# Patient Record
Sex: FEMALE | Race: WHITE | NOT HISPANIC OR LATINO | Employment: OTHER | URBAN - NONMETROPOLITAN AREA
[De-identification: names, ages, dates, MRNs, and addresses within clinical notes are randomized per-mention and may not be internally consistent; named-entity substitution may affect disease eponyms.]

---

## 2017-12-11 ENCOUNTER — COMMUNICATION - GICH (OUTPATIENT)
Dept: FAMILY MEDICINE | Facility: OTHER | Age: 62
End: 2017-12-11

## 2017-12-12 ENCOUNTER — OFFICE VISIT - GICH (OUTPATIENT)
Dept: FAMILY MEDICINE | Facility: OTHER | Age: 62
End: 2017-12-12

## 2017-12-12 ENCOUNTER — HISTORY (OUTPATIENT)
Dept: FAMILY MEDICINE | Facility: OTHER | Age: 62
End: 2017-12-12

## 2017-12-12 DIAGNOSIS — K22.4 DYSKINESIA OF ESOPHAGUS: ICD-10-CM

## 2018-02-07 ENCOUNTER — OFFICE VISIT - GICH (OUTPATIENT)
Dept: FAMILY MEDICINE | Facility: OTHER | Age: 63
End: 2018-02-07

## 2018-02-07 ENCOUNTER — HISTORY (OUTPATIENT)
Dept: FAMILY MEDICINE | Facility: OTHER | Age: 63
End: 2018-02-07

## 2018-02-07 DIAGNOSIS — L90.0 LICHEN SCLEROSUS ET ATROPHICUS: ICD-10-CM

## 2018-02-07 DIAGNOSIS — Z00.00 ENCOUNTER FOR GENERAL ADULT MEDICAL EXAMINATION WITHOUT ABNORMAL FINDINGS: ICD-10-CM

## 2018-02-09 VITALS
WEIGHT: 116 LBS | HEART RATE: 66 BPM | BODY MASS INDEX: 19.81 KG/M2 | DIASTOLIC BLOOD PRESSURE: 66 MMHG | SYSTOLIC BLOOD PRESSURE: 114 MMHG | HEIGHT: 64 IN

## 2018-02-09 VITALS
HEIGHT: 64 IN | DIASTOLIC BLOOD PRESSURE: 62 MMHG | WEIGHT: 116.2 LBS | SYSTOLIC BLOOD PRESSURE: 108 MMHG | HEART RATE: 60 BPM | BODY MASS INDEX: 19.84 KG/M2

## 2018-02-12 ENCOUNTER — TELEPHONE (OUTPATIENT)
Dept: FAMILY MEDICINE | Facility: OTHER | Age: 63
End: 2018-02-12

## 2018-02-12 NOTE — NURSING NOTE
Patient Information     Patient Name MRN Wanda Plascencia [id number] Female 1955      Nursing Note by Jeanna Barros at 2017 11:30 AM     Author:  Jeanna Barros Service:  (none) Author Type:  (none)     Filed:  2017 11:50 AM Encounter Date:  2017 Status:  Signed     :  Jeanna Barros            Patient is here dir concerns of heaviness in chest, a warm sensation around abdomen, numbness in arms at times, a pinched nerve in shoulder blade, and a sharp pain down hip. Patient states all has been going on for a couple weeks. Patient states is all same as symptoms as last year.  Jeanna Barros LPN .............2017  11:35 AM

## 2018-02-12 NOTE — PATIENT INSTRUCTIONS
Patient Information     Patient Name MRN Sex Wanda Elias N [id number] Female 1955      Patient Instructions by Mary Barnard MD at 2017 11:30 AM     Author:  Mary Barnard MD  Service:  (none) Author Type:  Physician     Filed:  2017 12:13 PM  Encounter Date:  2017 Status:  Addendum     :  Mary Barnard MD (Physician)        Related Notes: Original Note by Mary Barnard MD (Physician) filed at 2017 12:12 PM            Esophageal spasm related to over acid production  prilosec 20 mg daily x 4 weeks  carafate twice daily x 2 weeks    If no change, recommend abdominal ultrasound (you can call/mychart)    Could also consider CT scan for further evaluation    Return if increased pain, associated with exercise would be concerning    Consider DEXA (bone density) to check for osteoporosis screening

## 2018-02-12 NOTE — TELEPHONE ENCOUNTER
"Patient Information     Patient Name MRN Wanda Plascencia N [id number] Female 1955      Telephone Encounter by Cee Bar RN at 2017  4:53 PM     Author:  Cee Bar RN Service:  (none) Author Type:  NURS- Registered Nurse     Filed:  2017  5:31 PM Encounter Date:  2017 Status:  Signed     :  Cee Bar RN (NURS- Registered Nurse)            Patient called today complaining of a \"warm sensation in her back, stomach and down her leg\". Patient also has a \"pinch\" or pressure between her breasts. Patient has shoulder blade pain on right that feels like a pinched nerve. Based on symptomology and triage protocol, Patient fits the category for a visit to the ED. Patient was very resistant to going to the ED and was then encouraged, if she would not go, to make an appointment ASAP tomorrow with a provider in the clinic. Patient has been trying to decide who to establish care with as PCP will no longer be seeing Patients. Discussed in depth, the risks and how women can experience a different set of symptoms than men for a heart attack. Patient was strongly urged to come to ED this evening if she experiences any of the following: pain lasting greater than a few seconds, increased/different pain in shoulder, arm or jaw, difficulty breathing, lightheadedness, visible sweating, slow or rapid pulse, or confusion. Patient verbalized agreement with this plan.     Intermittent chest pain is on Patient's problem list.    Reason for Disposition    Dizziness or lightheadedness     Attributes to eye problems (trouble focusing).    Pain also present in shoulder(s) or arm(s) or jaw  (Exception: pain is clearly made worse by movement)     Patient states she has pain in jaw from time to time, but attributes it to TMJ.  Patient states she has pain in right shoulder blade, like a pinched nerve.    [1] Intermittent  chest pain or \"angina\" AND [2] increasing in severity or frequency  " "(Exception: pains lasting a few seconds)     Patient describes a \"pinch\" or pressure in between breasts. Patient denies shooting pains or pain lasting even a few seconds.    [1] Intermittent chest pain from \"angina\" AND [2] NO increase in severity or frequency    Answer Assessment - Initial Assessment Questions  1. LOCATION: \"Where does it hurt?\"    Patient describes \"pinch\" and pressure in between breasts    2. RADIATION: \"Does the pain go anywhere else?\" (e.g., into neck, jaw, arms, back)  Patient describes \"warm sensation\" in stomach, back and leg  Patient describes pain in right shoulder/hip, \"like pinched nerve\"  Jaw pain (from time to time, Patient has TMJ)  Heart racing (felt this a couple of times)- attributes this to anxiety    3. ONSET: \"When did the chest pain begin?\" (Minutes, hours or days)   Patient having a dull underlying pain she feels once in a while    4. PATTERN \"Does the pain come and go, or has it been constant since it started?\"  \"Does it get worse with exertion?\"   Pain and \"warm sensation\" moves around (not in any one area for long)    5. DURATION: \"How long does it last\" (e.g., seconds, minutes, hours)  Underlying and dull (hard to estimate and hard to explain)    6. SEVERITY: \"How bad is the pain?\"  (e.g., Scale 1-10; mild, moderate, or severe)     - MILD (1-3): doesn't interfere with normal activities      - MODERATE (4-7): interferes with normal activities or awakens from sleep     - SEVERE (8-10): excruciating pain, unable to do any normal activities    MILD (1-3)    7. CARDIAC RISK FACTORS: \"Do you have any history of heart problems or risk factors for heart disease?\" (e.g., prior heart attack, angina; high blood pressure, diabetes, being overweight, high cholesterol, smoking, or strong family history of heart disease)  Patient has come in before for similar symptoms, but all cardiac testing was negative    8. PULMONARY RISK FACTORS: \"Do you have any history of lung disease?\"  (e.g., " "blood clots in lung, asthma, emphysema, birth control pills)  Patient denies    9. CAUSE: \"What do you think is causing the chest pain?\"  Unsure    10. OTHER SYMPTOMS: \"Do you have any other symptoms?\" (e.g., dizziness, nausea, vomiting, sweating, fever, difficulty breathing, cough)  Dizziness/Nausea (Patient attributes this to eye problems-trouble focusing)  Sweating yesterday evening (attributed this to just taking hot bath)    Patient denies vomiting, fever, shortness of breath or cough    Patient states she lost her son 8 years ago, and after that had \"heaviness in her chest, felt like a brick and was hard to breath\". She attributed this to grief and this time it feels very different.    Protocols used: ADULT CHEST PAIN-A-    Cee Bar RN .............. 12/11/2017  5:31 PM            "

## 2018-02-12 NOTE — PROGRESS NOTES
Patient Information     Patient Name MRN Sex Wanda Elias [id number] Female 1955      Progress Notes by Mary Barnard MD at 2017 11:30 AM     Author:  Mary Barnard MD Service:  (none) Author Type:  Physician     Filed:  2017 10:39 AM Encounter Date:  2017 Status:  Signed     :  Mary Barnard MD (Physician)            SUBJECTIVE:    Wanda Crespo is a 62 y.o. female who presents for discomfort in epigastric area, feels similar to episode  Last year that responded to PPI and carafate  Describes as a burning sensation, also has right shoulder discomfort, this has been an on going issue but more frequent recently  Not associated with exercise  NO associated nausea, vomiting, sweating    HPI    No Known Allergies,   Current Outpatient Prescriptions on File Prior to Visit       Medication  Sig Dispense Refill     calcium carbonate (CALCIUM 500) 500 mg calcium (1,250 mg) tablet Take 1 tablet by mouth 3 times daily with meals.  0     cholecalciferol (VITAMIN D-3) 2,000 unit capsule Take 1 capsule by mouth once daily.  0     multivitamin (MVI) tablet Take 1 tablet by mouth once daily.  0     Omega-3 Fatty Acids 1,250 mg capsule Take 1 capsule by mouth 2 times daily.  0     No current facility-administered medications on file prior to visit.     and   Patient Active Problem List      Diagnosis Date Noted     LESION, VULVA 2012     CHEST PAIN, INTERMITTENT 2012     OTHER LICHEN NOT ELSEWHERE CLASSIFIED 2012     MOURNING 2010       REVIEW OF SYSTEMS:  Review of Systems   Constitutional: Negative for chills, fever, malaise/fatigue and weight loss.   HENT: Negative for congestion, hearing loss and nosebleeds.    Respiratory: Negative for cough and shortness of breath.    Cardiovascular: Negative for chest pain, palpitations, orthopnea, claudication and leg swelling.   Gastrointestinal: Positive for abdominal pain and heartburn. Negative  "for blood in stool, constipation, diarrhea, melena, nausea and vomiting.   Genitourinary: Negative for frequency and urgency.   Musculoskeletal: Negative for falls, myalgias and neck pain.   Skin: Negative for rash.   Neurological: Negative for tingling, sensory change and focal weakness.       OBJECTIVE:  /66 (Cuff Site: Right Arm, Position: Sitting, Cuff Size: Adult Regular)  Pulse 66  Ht 1.613 m (5' 3.5\")  Wt 52.6 kg (116 lb)  BMI 20.23 kg/m2    EXAM:   Physical Exam   Constitutional: She is well-developed, well-nourished, and in no distress.   HENT:   Right Ear: External ear normal.   Left Ear: External ear normal.   Nose: Nose normal.   Neck: No thyromegaly present.   Cardiovascular: Normal rate and regular rhythm.    Pulmonary/Chest: Effort normal and breath sounds normal. No respiratory distress.   Abdominal: Soft. Bowel sounds are normal. She exhibits no distension and no mass. There is no tenderness. There is no rebound and no guarding.   Musculoskeletal: She exhibits no edema.   Lymphadenopathy:     She has no cervical adenopathy.   Skin: No rash noted.       ASSESSMENT/PLAN:    ICD-10-CM    1. Esophageal spasm K22.4 omeprazole (PRILOSEC) 20 mg Delayed-Release capsule      sucralfate (CARAFATE) 100 mg/mL suspension        Plan:  Symptoms are very similar to last year which had responded to PPI and carafate. Does not sound cardiac,she is concerned about cost of work-up as she has HDP. Declines EKG, reviewed previous which was normal  Will proceed with PP and carafate  She will call if no change  Plan outlined below  She ios comfortable with this plan and will let me know if things change  Patient Instructions   Esophageal spasm related to over acid production  prilosec 20 mg daily x 4 weeks  carafate twice daily x 2 weeks    If no change, recommend abdominal ultrasound (you can call/mychart)    Could also consider CT scan for further evaluation    Return if increased pain, associated with exercise " would be concerning    Consider DEXA (bone density) to check for osteoporosis screening    Total of 25 minutes was spent with patient in counseling and coordination of care.  Mary Chaudhary MD  10:36 AM 12/21/2017

## 2018-02-13 ENCOUNTER — DOCUMENTATION ONLY (OUTPATIENT)
Dept: FAMILY MEDICINE | Facility: OTHER | Age: 63
End: 2018-02-13

## 2018-02-13 RX ORDER — CALCIUM CARBONATE/VITAMIN D3 600 MG-10
1250 TABLET ORAL 2 TIMES DAILY
COMMUNITY
Start: 2016-11-22 | End: 2024-02-08

## 2018-02-13 RX ORDER — ACETAMINOPHEN 160 MG
2000 TABLET,DISINTEGRATING ORAL DAILY
COMMUNITY
Start: 2016-11-22

## 2018-02-13 RX ORDER — DIPHENOXYLATE HYDROCHLORIDE AND ATROPINE SULFATE 2.5; .025 MG/1; MG/1
1 TABLET ORAL DAILY
COMMUNITY
Start: 2016-11-22 | End: 2024-02-08

## 2018-02-13 RX ORDER — IBUPROFEN 200 MG
1250 CAPSULE ORAL DAILY
COMMUNITY
Start: 2016-11-22

## 2018-02-13 RX ORDER — SUCRALFATE ORAL 1 G/10ML
1000 SUSPENSION ORAL 2 TIMES DAILY PRN
COMMUNITY
Start: 2017-12-12 | End: 2019-10-23

## 2018-02-13 NOTE — PATIENT INSTRUCTIONS
Patient Information     Patient Name MRN Wanda Plascencia N [id number] Female 1955      Patient Instructions by Kamryn Lopez MD at 2018  8:15 AM     Author:  Kamryn Lopez MD Service:  (none) Author Type:  Physician     Filed:  2018  9:11 AM Encounter Date:  2018 Status:  Signed     :  Kamryn Lopez MD (Physician)            Consider topical estrogen for vaginal dryness. This can be estradiol cream, a ring (estring) or a tablet (vagifem) form. If wanting an prescription sent to your pharmacy, please let me know.     Also consider using water based vaginal lubrication.

## 2018-02-13 NOTE — NURSING NOTE
Patient Information     Patient Name MRN Wanda Plascencia [id number] Female 1955      Nursing Note by Ling Cain at 2018  8:15 AM     Author:  Ling Cain Service:  (none) Author Type:  (none)     Filed:  2018  8:35 AM Encounter Date:  2018 Status:  Signed     :  Ling Cain            Patient here for yearly physical.   Ling Cain LPN ..........2018 8:12 AM
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
## 2018-02-16 ENCOUNTER — HOSPITAL ENCOUNTER (OUTPATIENT)
Dept: MAMMOGRAPHY | Facility: OTHER | Age: 63
Discharge: HOME | End: 2018-02-16
Attending: FAMILY MEDICINE | Admitting: FAMILY MEDICINE
Payer: COMMERCIAL

## 2018-02-16 DIAGNOSIS — Z12.39 SCREENING BREAST EXAMINATION: ICD-10-CM

## 2018-02-16 PROCEDURE — 77067 SCR MAMMO BI INCL CAD: CPT

## 2018-02-18 ENCOUNTER — HEALTH MAINTENANCE LETTER (OUTPATIENT)
Age: 63
End: 2018-02-18

## 2018-02-22 ENCOUNTER — TELEPHONE (OUTPATIENT)
Dept: FAMILY MEDICINE | Facility: OTHER | Age: 63
End: 2018-02-22

## 2018-02-22 NOTE — TELEPHONE ENCOUNTER
Returned call to patient and verified last name and date of birth. Patient was looking for mammogram results. I read the letter that was mailed out to her on the 16th.    Katelyn Robles LPN on 2/22/2018 at 2:07 PM

## 2018-03-05 NOTE — ADDENDUM NOTE
Patient Information     Patient Name MRN Wanda Plascencia [id number] Female 1955      Addendum Note by Brielle Davis at 2018 11:11 AM     Author:  Brielle Davis Service:  (none) Author Type:  (none)     Filed:  2018 11:11 AM Encounter Date:  2018 Status:  Signed     :  Brielle Davis       Addended by: BRIELLE DAVIS on: 2018 11:11 AM        Modules accepted: Orders

## 2018-03-05 NOTE — PROGRESS NOTES
Patient Information     Patient Name MRN Sex Wanda Elias N [id number] Female 1955      Progress Notes by Kamryn Lopez MD at 2018  3:19 PM     Author:  Kamryn Lopez MD Service:  (none) Author Type:  Physician     Filed:  2018  3:19 PM Encounter Date:  2018 Status:  Signed     :  Kamryn Lopez MD (Physician)            Notified of results via [employer].

## 2018-03-16 ENCOUNTER — TELEPHONE (OUTPATIENT)
Dept: FAMILY MEDICINE | Facility: OTHER | Age: 63
End: 2018-03-16

## 2018-03-16 NOTE — TELEPHONE ENCOUNTER
Patient calling regarding receiving an e-mail stating she had new results. Informed Patient that we have had a computer change and some old results are being placed in the system.  She currently has no new results in her chart. Explained about the new my chart and things will come under [place] not robyn Cain LPN ..........3/16/2018 9:21 AM

## 2019-10-23 ENCOUNTER — OFFICE VISIT (OUTPATIENT)
Dept: FAMILY MEDICINE | Facility: OTHER | Age: 64
End: 2019-10-23
Attending: FAMILY MEDICINE
Payer: COMMERCIAL

## 2019-10-23 VITALS
DIASTOLIC BLOOD PRESSURE: 62 MMHG | HEIGHT: 64 IN | RESPIRATION RATE: 16 BRPM | BODY MASS INDEX: 19.46 KG/M2 | HEART RATE: 60 BPM | WEIGHT: 114 LBS | TEMPERATURE: 96.9 F | SYSTOLIC BLOOD PRESSURE: 110 MMHG

## 2019-10-23 DIAGNOSIS — Z00.00 HEALTH CARE MAINTENANCE: Primary | ICD-10-CM

## 2019-10-23 DIAGNOSIS — L90.0 LICHEN SCLEROSUS ET ATROPHICUS: ICD-10-CM

## 2019-10-23 PROCEDURE — 99396 PREV VISIT EST AGE 40-64: CPT | Performed by: FAMILY MEDICINE

## 2019-10-23 RX ORDER — CLOBETASOL PROPIONATE 0.5 MG/G
CREAM TOPICAL 2 TIMES DAILY
Qty: 15 G | Refills: 1 | Status: SHIPPED | OUTPATIENT
Start: 2019-10-23 | End: 2021-04-21

## 2019-10-23 ASSESSMENT — PAIN SCALES - GENERAL: PAINLEVEL: NO PAIN (0)

## 2019-10-23 ASSESSMENT — MIFFLIN-ST. JEOR: SCORE: 1049.16

## 2019-10-23 NOTE — NURSING NOTE
"Patient here for yearly physical.  Ling Cain LPN ..........10/23/2019 1:28 PM   Chief Complaint   Patient presents with     Physical     yearly       Initial /62 (BP Location: Right arm, Patient Position: Sitting, Cuff Size: Adult Regular)   Pulse 60   Temp 96.9  F (36.1  C) (Tympanic)   Resp 16   Ht 1.613 m (5' 3.5\")   Wt 51.7 kg (114 lb)   LMP  (LMP Unknown)   BMI 19.88 kg/m   Estimated body mass index is 19.88 kg/m  as calculated from the following:    Height as of this encounter: 1.613 m (5' 3.5\").    Weight as of this encounter: 51.7 kg (114 lb).  Medication Reconciliation: complete    Ling Cain LPN    "
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
## 2019-10-29 DIAGNOSIS — L90.0 LICHEN SCLEROSUS ET ATROPHICUS: ICD-10-CM

## 2019-10-29 RX ORDER — CLOBETASOL PROPIONATE 0.5 MG/G
CREAM TOPICAL
Qty: 15 G | Refills: 1 | Status: CANCELLED | OUTPATIENT
Start: 2019-10-31

## 2019-11-26 ENCOUNTER — HOSPITAL ENCOUNTER (OUTPATIENT)
Dept: MAMMOGRAPHY | Facility: OTHER | Age: 64
Discharge: HOME | End: 2019-11-26
Attending: FAMILY MEDICINE | Admitting: FAMILY MEDICINE
Payer: COMMERCIAL

## 2019-11-26 DIAGNOSIS — Z00.00 HEALTH CARE MAINTENANCE: ICD-10-CM

## 2019-11-26 DIAGNOSIS — Z12.31 VISIT FOR SCREENING MAMMOGRAM: ICD-10-CM

## 2019-11-26 PROCEDURE — 77063 BREAST TOMOSYNTHESIS BI: CPT

## 2020-01-21 ENCOUNTER — ALLIED HEALTH/NURSE VISIT (OUTPATIENT)
Dept: FAMILY MEDICINE | Facility: OTHER | Age: 65
End: 2020-01-21
Attending: FAMILY MEDICINE
Payer: COMMERCIAL

## 2020-01-21 DIAGNOSIS — Z23 NEED FOR ZOSTER VACCINATION: Primary | ICD-10-CM

## 2020-01-21 PROCEDURE — 90750 HZV VACC RECOMBINANT IM: CPT

## 2020-01-21 PROCEDURE — 90471 IMMUNIZATION ADMIN: CPT

## 2020-01-21 NOTE — PROGRESS NOTES
Patient stated reason for visit today is to receive 1st Shingrix vaccine. Patient denied any concerns with previous injections. Injection prepared and administered IM into LD as ordered. Administration of medication documented in MAR (see MAR for further information regarding dose, lot #, NDC #, expiration date). Patient encouraged to wait in lobby for 15 minutes post-injection and notify staff immediately of any reaction.     Copy of VIS given    Upcoming appt for booster 4/6/2020    Payton Camp RN  ....................  1/21/2020   9:16 AM

## 2020-03-10 ENCOUNTER — HEALTH MAINTENANCE LETTER (OUTPATIENT)
Age: 65
End: 2020-03-10

## 2020-04-06 ENCOUNTER — ALLIED HEALTH/NURSE VISIT (OUTPATIENT)
Dept: FAMILY MEDICINE | Facility: OTHER | Age: 65
End: 2020-04-06
Attending: FAMILY MEDICINE
Payer: COMMERCIAL

## 2020-04-06 DIAGNOSIS — Z23 NEED FOR ZOSTER VACCINATION: Primary | ICD-10-CM

## 2020-04-06 PROCEDURE — 90471 IMMUNIZATION ADMIN: CPT

## 2020-04-06 PROCEDURE — 90750 HZV VACC RECOMBINANT IM: CPT

## 2020-04-06 NOTE — PROGRESS NOTES
Immunization Documentation  Verified patient's name and . Stated reason for visit today is to receive Shingrix vaccine(s). Denied any concerns with previous immunizations. Allergies reviewed.  Screening Questionnaire Adult Immunization completed (see below). VIS handout(s) reviewed and given to take home. Shingrix prepared and administered IM per standing order. Administration documented in IMMUNIZATIONS (see flowsheet and order for further information). Instructed to wait in lobby for 15 minutes post-injection and notify RN immediately of any adverse reaction.     Screening Questionnaire for Adult Immunization    Are you sick today?   No   Do you have allergies to medications, food, a vaccine component, or latex?   No   Have you ever had a serious reaction after receiving a vaccination?   No   Have you received any vaccinations in the past 4 weeks?   No     Immunization questionnaire answers were all negative.    Miller Kurtz RN, BSN  ....................  2020   8:07 AM

## 2020-09-01 DIAGNOSIS — Z12.11 SCREENING FOR COLON CANCER: ICD-10-CM

## 2020-09-01 DIAGNOSIS — Z01.818 PRE-OP TESTING: Primary | ICD-10-CM

## 2020-09-01 NOTE — TELEPHONE ENCOUNTER
Screening Questions for the Scheduling of Screening Colonoscopies   (If Colonoscopy is diagnostic, Provider should review the chart before scheduling.)  Are you younger than 50 or older than 80?  NO   Do you take aspirin or fish oil?  YES - FISH OIL  (if yes, tell patient to stop 1 week prior to Colonoscopy)  Do you take warfarin (Coumadin), clopidogrel (Plavix), apixaban (Eliquis), dabigatram (Pradaxa), rivaroxaban (Xarelto) or any blood thinner? NO   Do you use oxygen at home?  NO   Do you have kidney disease? NO   Are you on dialysis? NO   Have you had a stroke or heart attack in the last year? NO   Have you had a stent in your heart or any blood vessel in the last year? NO   Have you had a transplant of any organ? NO   Have you had a colonoscopy or upper endoscopy (EGD) before? YES          When?  2010  Date of scheduled Colonoscopy. 09/29/2020  Provider [facility]   Pharmacy [facility]

## 2020-09-14 RX ORDER — BISACODYL 5 MG
TABLET, DELAYED RELEASE (ENTERIC COATED) ORAL
Qty: 2 TABLET | Refills: 0 | Status: ON HOLD | OUTPATIENT
Start: 2020-09-14 | End: 2020-09-29

## 2020-09-14 RX ORDER — POLYETHYLENE GLYCOL 3350, SODIUM CHLORIDE, SODIUM BICARBONATE, POTASSIUM CHLORIDE 420; 11.2; 5.72; 1.48 G/4L; G/4L; G/4L; G/4L
4000 POWDER, FOR SOLUTION ORAL ONCE
Qty: 4000 ML | Refills: 0 | Status: SHIPPED | OUTPATIENT
Start: 2020-09-14 | End: 2020-09-14

## 2020-09-26 ENCOUNTER — ALLIED HEALTH/NURSE VISIT (OUTPATIENT)
Dept: FAMILY MEDICINE | Facility: OTHER | Age: 65
End: 2020-09-26
Attending: SURGERY
Payer: COMMERCIAL

## 2020-09-26 DIAGNOSIS — Z01.818 PRE-OP TESTING: ICD-10-CM

## 2020-09-26 PROCEDURE — C9803 HOPD COVID-19 SPEC COLLECT: HCPCS

## 2020-09-26 PROCEDURE — 99207 ZZC NO CHARGE NURSE ONLY: CPT

## 2020-09-26 PROCEDURE — U0003 INFECTIOUS AGENT DETECTION BY NUCLEIC ACID (DNA OR RNA); SEVERE ACUTE RESPIRATORY SYNDROME CORONAVIRUS 2 (SARS-COV-2) (CORONAVIRUS DISEASE [COVID-19]), AMPLIFIED PROBE TECHNIQUE, MAKING USE OF HIGH THROUGHPUT TECHNOLOGIES AS DESCRIBED BY CMS-2020-01-R: HCPCS | Mod: ZL | Performed by: SURGERY

## 2020-09-26 NOTE — NURSING NOTE
Patient swabbed for COVID-19 testing.  Nidia Coronado LPN on 9/26/2020 at 8:28 AM       non-verbal indicators present

## 2020-09-27 LAB
SARS-COV-2 RNA SPEC QL NAA+PROBE: NOT DETECTED
SPECIMEN SOURCE: NORMAL

## 2020-09-29 ENCOUNTER — HOSPITAL ENCOUNTER (OUTPATIENT)
Facility: OTHER | Age: 65
Discharge: HOME | End: 2020-09-29
Attending: SURGERY | Admitting: SURGERY
Payer: COMMERCIAL

## 2020-09-29 ENCOUNTER — ANESTHESIA (OUTPATIENT)
Dept: SURGERY | Facility: OTHER | Age: 65
End: 2020-09-29
Payer: COMMERCIAL

## 2020-09-29 ENCOUNTER — ANESTHESIA EVENT (OUTPATIENT)
Dept: SURGERY | Facility: OTHER | Age: 65
End: 2020-09-29
Payer: COMMERCIAL

## 2020-09-29 VITALS
OXYGEN SATURATION: 99 % | RESPIRATION RATE: 16 BRPM | HEIGHT: 63 IN | TEMPERATURE: 95.5 F | DIASTOLIC BLOOD PRESSURE: 112 MMHG | SYSTOLIC BLOOD PRESSURE: 134 MMHG | WEIGHT: 114 LBS | BODY MASS INDEX: 20.2 KG/M2 | HEART RATE: 105 BPM

## 2020-09-29 DIAGNOSIS — K63.5 POLYP OF COLON, UNSPECIFIED PART OF COLON, UNSPECIFIED TYPE: Primary | ICD-10-CM

## 2020-09-29 PROCEDURE — 45380 COLONOSCOPY AND BIOPSY: CPT | Mod: PT,XU | Performed by: SURGERY

## 2020-09-29 PROCEDURE — 25000128 H RX IP 250 OP 636: Performed by: NURSE ANESTHETIST, CERTIFIED REGISTERED

## 2020-09-29 PROCEDURE — 45380 COLONOSCOPY AND BIOPSY: CPT | Mod: PT | Performed by: SURGERY

## 2020-09-29 PROCEDURE — 25800030 ZZH RX IP 258 OP 636: Performed by: SURGERY

## 2020-09-29 PROCEDURE — 45384 COLONOSCOPY W/LESION REMOVAL: CPT | Performed by: SURGERY

## 2020-09-29 PROCEDURE — 45385 COLONOSCOPY W/LESION REMOVAL: CPT | Mod: PT | Performed by: SURGERY

## 2020-09-29 PROCEDURE — 25000132 ZZH RX MED GY IP 250 OP 250 PS 637: Performed by: SURGERY

## 2020-09-29 PROCEDURE — 25000125 ZZHC RX 250: Performed by: SURGERY

## 2020-09-29 PROCEDURE — 45385 COLONOSCOPY W/LESION REMOVAL: CPT | Mod: PT

## 2020-09-29 PROCEDURE — 88305 TISSUE EXAM BY PATHOLOGIST: CPT

## 2020-09-29 PROCEDURE — 40000010 ZZH STATISTIC ANES STAT CODE-CRNA PER MINUTE: Performed by: SURGERY

## 2020-09-29 PROCEDURE — 25000125 ZZHC RX 250: Performed by: NURSE ANESTHETIST, CERTIFIED REGISTERED

## 2020-09-29 PROCEDURE — 45385 COLONOSCOPY W/LESION REMOVAL: CPT | Performed by: NURSE ANESTHETIST, CERTIFIED REGISTERED

## 2020-09-29 RX ORDER — PROPOFOL 10 MG/ML
INJECTION, EMULSION INTRAVENOUS CONTINUOUS PRN
Status: DISCONTINUED | OUTPATIENT
Start: 2020-09-29 | End: 2020-09-29

## 2020-09-29 RX ORDER — LIDOCAINE 40 MG/G
CREAM TOPICAL
Status: DISCONTINUED | OUTPATIENT
Start: 2020-09-29 | End: 2020-09-29 | Stop reason: HOSPADM

## 2020-09-29 RX ORDER — LIDOCAINE HYDROCHLORIDE 20 MG/ML
INJECTION, SOLUTION INFILTRATION; PERINEURAL PRN
Status: DISCONTINUED | OUTPATIENT
Start: 2020-09-29 | End: 2020-09-29

## 2020-09-29 RX ORDER — NALOXONE HYDROCHLORIDE 0.4 MG/ML
.1-.4 INJECTION, SOLUTION INTRAMUSCULAR; INTRAVENOUS; SUBCUTANEOUS
Status: DISCONTINUED | OUTPATIENT
Start: 2020-09-29 | End: 2020-09-29 | Stop reason: HOSPADM

## 2020-09-29 RX ORDER — SIMETHICONE
LIQUID (ML) MISCELLANEOUS PRN
Status: DISCONTINUED | OUTPATIENT
Start: 2020-09-29 | End: 2020-09-29 | Stop reason: HOSPADM

## 2020-09-29 RX ORDER — SODIUM CHLORIDE, SODIUM LACTATE, POTASSIUM CHLORIDE, CALCIUM CHLORIDE 600; 310; 30; 20 MG/100ML; MG/100ML; MG/100ML; MG/100ML
INJECTION, SOLUTION INTRAVENOUS CONTINUOUS
Status: DISCONTINUED | OUTPATIENT
Start: 2020-09-29 | End: 2020-09-29 | Stop reason: HOSPADM

## 2020-09-29 RX ORDER — PROPOFOL 10 MG/ML
INJECTION, EMULSION INTRAVENOUS PRN
Status: DISCONTINUED | OUTPATIENT
Start: 2020-09-29 | End: 2020-09-29

## 2020-09-29 RX ORDER — FLUMAZENIL 0.1 MG/ML
0.2 INJECTION, SOLUTION INTRAVENOUS
Status: DISCONTINUED | OUTPATIENT
Start: 2020-09-29 | End: 2020-09-29 | Stop reason: HOSPADM

## 2020-09-29 RX ADMIN — PROPOFOL 50 MG: 10 INJECTION, EMULSION INTRAVENOUS at 07:31

## 2020-09-29 RX ADMIN — PROPOFOL 140 MCG/KG/MIN: 10 INJECTION, EMULSION INTRAVENOUS at 07:31

## 2020-09-29 RX ADMIN — LIDOCAINE HYDROCHLORIDE 0.1 ML: 10 INJECTION, SOLUTION EPIDURAL; INFILTRATION; INTRACAUDAL; PERINEURAL at 07:14

## 2020-09-29 RX ADMIN — SODIUM CHLORIDE, POTASSIUM CHLORIDE, SODIUM LACTATE AND CALCIUM CHLORIDE 30 ML/HR: 600; 310; 30; 20 INJECTION, SOLUTION INTRAVENOUS at 07:14

## 2020-09-29 RX ADMIN — LIDOCAINE HYDROCHLORIDE 60 MG: 20 INJECTION, SOLUTION INFILTRATION; PERINEURAL at 07:31

## 2020-09-29 ASSESSMENT — MIFFLIN-ST. JEOR: SCORE: 1036.23

## 2020-09-29 NOTE — DISCHARGE INSTRUCTIONS
Your doctor recommends that you eat 25 to 30 Grams of fiber daily. The following are some examples of fiber amounts in different foods.    Fruits: Apple (with skin) 1 medium = 4.4 Grams   Banana      1 medium = 3.1 Grams   Oranges     1 orange = 3.1 Grams   Prunes     1 cup, pitted = 12.4 Grams    Juices: Apple, unsweetened w/ added ascorbic acid  1 cup = 0.5 Grams    Grapefruit, white, canned,sweetened  1 cup = 0.2 Grams    Grape, unsweetened w/ added ascorbic acid  1 cup = 0.5 Grams    Orange     1 cup = 0.7 Grams    Vegetables:   Cooked: Green Beans   1 cup = 4.0 Grams       Carrots   1/2 cup sliced = 2.3 Grams       Peas       1 cup = 8.8 Grams       Potato (baked, with skin)  1 medium = 3.8 Grams    Raw: Cucumber (with peel)  1 cucumber = 1.5 Grams            Lettuce     1 cup shredded = 0.5 Grams            Tomato   1 medium tomato = 1.5 Grams            Spinach  1 cup = 0.7 Grams    Legumes: Baked beans, canned, no salt added  1 cup = 13.9 Grams         Kidney Beans, canned  1 cup = 13.6 Grams         High Beans, canned     1 cup = 11.6 Grams         Lentils, boiled   1 cup = 15.6 Grams    Breads, Pastas, Flours: Bran muffins   1 medium muffin = 5.2 Grams           Oatmeal, cooked  1 cup = 4.0 Grams           White Bread   1 slice = 0.6 Grams           Whole- wheat bread = 1.9 Grams    Pasta and rice, cooked: Macaroni  1 cup = 2.5 Grams           Rice, Brown  1 cup = 3.5 Grams           Rice, white   1 cup = 0.6 Grams           Spaghetti (regular) 1 cup = 2.5 Grams    Nuts: Almonds   1 cup = 17.4 Grams            Peanuts    1 cup = 12.4 Grams          [facility]  Adult Discharge Orders & Instructions    ________________________________________________________________          For 12 hours after surgery  1. Get plenty of rest.  A responsible adult must stay with you for at least 12 hours after you leave the hospital.   2. You may feel lightheaded.  IF so, sit for a few minutes before standing.   Have someone help you get up.   3. You may have a slight fever. Call the doctor if your fever is over 101 F (38.3 C) (taken under the tongue) or lasts longer than 24 hours.  4. You may have a dry mouth, a sore throat, muscle aches or trouble sleeping.  These should go away after 24 hours.  5. Do not make important or legal decisions.  6.   Do not drive or use heavy equipment.  If you have weakness or tingling, don't drive or use heavy equipment until this feeling goes away.    To contact a doctor, call   [phone]_______________________

## 2020-09-29 NOTE — ANESTHESIA POSTPROCEDURE EVALUATION
Patient: Wanda Crespo    Procedure(s):  COLONOSCOPY, WITH LESION EXCISION USING HOT BIOPSY DEVICE    Diagnosis:Screening for colon cancer [Z12.11]  Diagnosis Additional Information: No value filed.    Anesthesia Type:  MAC    Note:  Anesthesia Post Evaluation    Patient location during evaluation: Bedside  Patient participation: Able to fully participate in evaluation  Level of consciousness: awake and alert  Pain management: adequate  Airway patency: patent  Cardiovascular status: acceptable  Respiratory status: acceptable  Hydration status: acceptable  PONV: none     Anesthetic complications: None          Last vitals:  Vitals:    09/29/20 0845 09/29/20 0900 09/29/20 0915   BP: 127/89 131/87    Pulse: 102 110 105   Resp: 16 16 16   Temp:   95.5  F (35.3  C)   SpO2: 99% 100% 99%         Electronically Signed By: MARIANELA Iglesias CRNA  September 29, 2020  9:46 AM
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
## 2020-09-29 NOTE — OP NOTE
COLONOSCOPY PROCEDURE NOTE    DATE OF SERVICE: 9/29/2020    SURGEON: FLORINDA Rojas MD     PRE-OP DIAGNOSIS:    Healthcare maintenance     POST-OP DIAGNOSIS:    Polyps at ascending colon and hepatic flexure    PROCEDURE:   Colonoscopy with snare polypectomy    ASSISTANT:  Ivyulator: Bernadette Chin RN  Scrub Person: Kalpana Fuentes  2nd Scrub: Nya Fairbanks  Pre-Op Nurse: Norma Palumbo RN  Post-Op Nurse: Lu Lake RN    ANESTHESIA:  MAC                            Monitor Anesthesia CareCRNA Independent: Emeka Pinto APRN CRNA    INDICATION FOR THE PROCEDURE: The patient is a 65 year old female in need of screening colonoscopy. The patient has no other complaints.  After explaining the risks to include bleeding, perforation, potential inability to reach the cecum the patient wishes to proceed.    PROCEDURE: After adequate sedation, the patient was in the left lateral decubitus position.  Rectal exam was performed.  There was normal tone and no palpable masses.  The colonoscope was introduced into the rectum and advanced to the cecum with Mild difficulty.  The patient's prep was fair.  The terminal cecum was reached.  The cecum, ascending, transverse, descending and sigmoid colon were significant for one 8mm polyp in the proximal ascending colon removed with hot snare and one 5-6mm flat polyp in the hepatic flexure removed with hot snare and cold forceps.  The scope was retroflexed in the rectum.  The anorectal junction was unremarkable.  The scope was straightened and removed.  The patient tolerated the procedure well.     ESTIMATED BLOOD LOSS: none    COMPLICATIONS:  None    TISSUE REMOVED:  Yes    RECOMMEND:    Follow-up pending pathology      FLORINDA Rojas MD

## 2020-09-29 NOTE — ANESTHESIA CARE TRANSFER NOTE
Patient: Wanda Crespo    Procedure(s):  COLONOSCOPY, WITH LESION EXCISION USING HOT BIOPSY DEVICE    Diagnosis: Screening for colon cancer [Z12.11]  Diagnosis Additional Information: No value filed.    Anesthesia Type:   MAC     Note:  Airway :Room Air  Patient transferred to:Phase II  Handoff Report: Identifed the Patient, Identified the Reponsible Provider, Reviewed the pertinent medical history, Discussed the surgical course, Reviewed Intra-OP anesthesia mangement and issues during anesthesia, Set expectations for post-procedure period and Allowed opportunity for questions and acknowledgement of understanding      Vitals: (Last set prior to Anesthesia Care Transfer)    CRNA VITALS  9/29/2020 0740 - 9/29/2020 0811      9/29/2020             Resp Rate (set):  10                Electronically Signed By: MARIANELA Iglesias CRNA  September 29, 2020  8:11 AM
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
## 2020-10-07 ENCOUNTER — TELEPHONE (OUTPATIENT)
Dept: SURGERY | Facility: OTHER | Age: 65
End: 2020-10-07

## 2020-12-27 ENCOUNTER — HEALTH MAINTENANCE LETTER (OUTPATIENT)
Age: 65
End: 2020-12-27

## 2021-04-21 ENCOUNTER — OFFICE VISIT (OUTPATIENT)
Dept: FAMILY MEDICINE | Facility: OTHER | Age: 66
End: 2021-04-21
Attending: FAMILY MEDICINE
Payer: COMMERCIAL

## 2021-04-21 VITALS
DIASTOLIC BLOOD PRESSURE: 62 MMHG | HEART RATE: 54 BPM | SYSTOLIC BLOOD PRESSURE: 122 MMHG | TEMPERATURE: 96.7 F | BODY MASS INDEX: 19.67 KG/M2 | OXYGEN SATURATION: 98 % | HEIGHT: 64 IN | RESPIRATION RATE: 18 BRPM | WEIGHT: 115.2 LBS

## 2021-04-21 DIAGNOSIS — Z00.00 ENCOUNTER FOR MEDICARE ANNUAL WELLNESS EXAM: Primary | ICD-10-CM

## 2021-04-21 DIAGNOSIS — E28.39 ESTROGEN DEFICIENCY: ICD-10-CM

## 2021-04-21 DIAGNOSIS — L90.0 LICHEN SCLEROSUS ET ATROPHICUS: ICD-10-CM

## 2021-04-21 DIAGNOSIS — Z12.31 ENCOUNTER FOR SCREENING MAMMOGRAM FOR BREAST CANCER: ICD-10-CM

## 2021-04-21 LAB
CHOLEST SERPL-MCNC: 213 MG/DL
HBA1C MFR BLD: 5.5 % (ref 4–6)
HDLC SERPL-MCNC: 70 MG/DL (ref 23–92)
LDLC SERPL CALC-MCNC: 128 MG/DL
NONHDLC SERPL-MCNC: 143 MG/DL
TRIGL SERPL-MCNC: 74 MG/DL

## 2021-04-21 PROCEDURE — 36415 COLL VENOUS BLD VENIPUNCTURE: CPT | Mod: ZL | Performed by: FAMILY MEDICINE

## 2021-04-21 PROCEDURE — 83036 HEMOGLOBIN GLYCOSYLATED A1C: CPT | Mod: ZL,GZ | Performed by: FAMILY MEDICINE

## 2021-04-21 PROCEDURE — 80061 LIPID PANEL: CPT | Mod: ZL | Performed by: FAMILY MEDICINE

## 2021-04-21 PROCEDURE — G0463 HOSPITAL OUTPT CLINIC VISIT: HCPCS

## 2021-04-21 PROCEDURE — G0402 INITIAL PREVENTIVE EXAM: HCPCS | Performed by: FAMILY MEDICINE

## 2021-04-21 RX ORDER — CLOBETASOL PROPIONATE 0.5 MG/G
CREAM TOPICAL 2 TIMES DAILY
Qty: 45 G | Refills: 2 | Status: SHIPPED | OUTPATIENT
Start: 2021-04-21 | End: 2022-08-01

## 2021-04-21 RX ORDER — TIMOLOL MALEATE 5 MG/ML
SOLUTION/ DROPS OPHTHALMIC
COMMUNITY
Start: 2021-03-25 | End: 2024-02-08

## 2021-04-21 ASSESSMENT — MIFFLIN-ST. JEOR: SCORE: 1044.6

## 2021-04-21 ASSESSMENT — PAIN SCALES - GENERAL: PAINLEVEL: NO PAIN (0)

## 2021-04-21 NOTE — PATIENT INSTRUCTIONS
Patient Education   Personalized Prevention Plan  You are due for the preventive services outlined below.  Your care team is available to assist you in scheduling these services.  If you have already completed any of these items, please share that information with your care team to update in your medical record.  Health Maintenance Due   Topic Date Due     Osteoporosis Screening  Never done     Discuss Advance Care Planning  Never done     HIV Screening  Never done     COVID-19 Vaccine (1) Never done     Hepatitis C Screening  Never done     Colorectal Cancer Screening  08/27/2015     Cholesterol Lab  02/09/2020     Flu Vaccine (1) Never done     FALL RISK ASSESSMENT  Never done     Pneumococcal Vaccine (1 of 1 - PPSV23) 03/12/2021

## 2021-04-21 NOTE — PROGRESS NOTES
"  SUBJECTIVE:   Wanda Crespo is a 66 year old female who presents to clinic today for the following health issues:    HPI    No LMP recorded. Patient is postmenopausal.   Contraception: n/a   Risk for STI?: no  Last pap: 2/2018 with neg HR HPV  Any hx of abnormal paps:  no  FH of early CA?: no  Tobacco?: no  Calcium intake: yes  DEXA:  not indicated   Last mammo: 11/2019  Colonoscopy: 9/2020 (2 sessile polyps, follow up in 5 years)  Immunizations: declines flu shot    Lipids due    {Historylist:104469}      Review of Systems     OBJECTIVE:     LMP  (LMP Unknown)   There is no height or weight on file to calculate BMI.  Physical Exam    {Diagnostic Test Results:514742::\"Diagnostic Test Results:\",\"none \"}    ASSESSMENT/PLAN:     {Quality Requirements:427453}    {Diag Picklist:971226}      Kamryn Lopez MD  [facility]  "

## 2021-04-21 NOTE — NURSING NOTE
"Patient here for medicare wellness.   Chief Complaint   Patient presents with     Physical     yearly Medicare?       Initial /62 (BP Location: Right arm, Patient Position: Sitting, Cuff Size: Adult Regular)   Pulse 54   Temp 96.7  F (35.9  C) (Tympanic)   Resp 18   Ht 1.613 m (5' 3.5\")   Wt 52.3 kg (115 lb 3.2 oz)   LMP  (LMP Unknown)   SpO2 98%   BMI 20.09 kg/m   Estimated body mass index is 20.09 kg/m  as calculated from the following:    Height as of this encounter: 1.613 m (5' 3.5\").    Weight as of this encounter: 52.3 kg (115 lb 3.2 oz).  Medication Reconciliation: complete    Ling Cain LPN    "

## 2021-04-21 NOTE — PROGRESS NOTES
"SUBJECTIVE:   Wanda Crespo is a 66 year old female who presents for Preventive Visit.      Patient has been advised of split billing requirements and indicates understanding: No  Are you in the first 12 months of your Medicare Part B coverage?  Scheduled with eye dr    Physical Health:    In general, how would you rate your overall physical health? excellent    Outside of work, how many days during the week do you exercise? 6-7 days/week    Outside of work, approximately how many minutes a day do you exercise?45-60 minutes    If you drink alcohol do you typically have >3 drinks per day or >7 drinks per week? No    Do you usually eat at least 4 servings of fruit and vegetables a day, include whole grains & fiber and avoid regularly eating high fat or \"junk\" foods? Yes    Do you have any problems taking medications regularly?  No    Do you have any side effects from medications? none    Needs assistance for the following daily activities: no assistance needed    Which of the following safety concerns are present in your home?  none identified     Hearing impairment: No    In the past 6 months, have you been bothered by leaking of urine? no    Mental Health:    In general, how would you rate your overall mental or emotional health? excellent  PHQ-2 Score: 0    Do you feel safe in your environment? Yes    Have you ever done Advance Care Planning? (For example, a Health Directive, POLST, or a discussion with a medical provider or your loved ones about your wishes): No, advance care planning information given to patient to review.  Patient plans to discuss their wishes with loved ones or provider.      Additional concerns to address?  No    Fall risk:  Fallen 2 or more times in the past year?: No  Any fall with injury in the past year?: No    Cognitive Screenin) Repeat 3 items (Leader, Season, Table)    2) Clock draw: NORMAL  3) 3 item recall: Recalls 1 object   Results: NORMAL clock, 1-2 items recalled: " COGNITIVE IMPAIRMENT LESS LIKELY    Mini-CogTM Copyright PAULY Grimes. Licensed by the author for use in [facility]; reprinted with permission (sheila@.[facility]). All rights reserved.        Do you have sleep apnea, excessive snoring or daytime drowsiness?: no      Reviewed and updated as needed this visit by clinical staff  Tobacco  Allergies  Meds   Med Hx  Surg Hx  Fam Hx  Soc Hx        Reviewed and updated as needed this visit by Provider                Social History     Tobacco Use     Smoking status: Never Smoker     Smokeless tobacco: Never Used   Substance Use Topics     Alcohol use: No                           Current providers sharing in care for this patient include:   Patient Care Team:  Kamryn Lopez MD as PCP - General (Family Practice)  Kamryn Lopez MD as Assigned PCP    The following health maintenance items are reviewed in Epic and correct as of today:  Health Maintenance   Topic Date Due     DEXA  Never done     ADVANCE CARE PLANNING  Never done     HIV SCREENING  Never done     COVID-19 Vaccine (1) Never done     HEPATITIS C SCREENING  Never done     COLORECTAL CANCER SCREENING  08/27/2015     LIPID  02/09/2020     INFLUENZA VACCINE (1) Never done     FALL RISK ASSESSMENT  Never done     Pneumococcal Vaccine: 65+ Years (1 of 1 - PPSV23) 03/12/2021     MAMMO SCREENING  11/26/2021     MEDICARE ANNUAL WELLNESS VISIT  04/21/2022     DTAP/TDAP/TD IMMUNIZATION (2 - Td) 02/07/2028     PHQ-2  Completed     ZOSTER IMMUNIZATION  Completed     Pneumococcal Vaccine: Pediatrics (0 to 5 Years) and At-Risk Patients (6 to 64 Years)  Aged Out     IPV IMMUNIZATION  Aged Out     MENINGITIS IMMUNIZATION  Aged Out     HEPATITIS B IMMUNIZATION  Aged Out       Pneumonia Vaccine: has not recieved  Mammogram Screening: Mammogram Screening: Recommended mammography every 1-2 years with patient discussion and risk factor consideration  Last pap 2018, consider repeat in 2023.    Recommend COVID vaccine.  "Attempted to address concerns. Patient very reluctant. Will hold off on pneumovax in case she changes her mind.     ROS:  Constitutional, HEENT, cardiovascular, pulmonary, gi and gu systems are negative, except as otherwise noted.    OBJECTIVE:   /62 (BP Location: Right arm, Patient Position: Sitting, Cuff Size: Adult Regular)   Pulse 54   Temp 96.7  F (35.9  C) (Tympanic)   Resp 18   Ht 1.613 m (5' 3.5\")   Wt 52.3 kg (115 lb 3.2 oz)   LMP  (LMP Unknown)   SpO2 98%   BMI 20.09 kg/m   Estimated body mass index is 20.09 kg/m  as calculated from the following:    Height as of this encounter: 1.613 m (5' 3.5\").    Weight as of this encounter: 52.3 kg (115 lb 3.2 oz).  EXAM:   GENERAL: healthy, alert and no distress  NECK: no adenopathy, no asymmetry, masses, or scars and thyroid normal to palpation  RESP: lungs clear to auscultation - no rales, rhonchi or wheezes  CV: regular rate and rhythm, normal S1 S2, no S3 or S4, no murmur, click or rub  MS: no gross musculoskeletal defects noted, no edema        ASSESSMENT / PLAN:       ICD-10-CM    1. Encounter for Medicare annual wellness exam  Z00.00 Lipid Panel     Hemoglobin A1c     Hemoglobin A1c     Lipid Panel   2. Lichen sclerosus et atrophicus  L90.0 clobetasol (TEMOVATE) 0.05 % external cream   3. Estrogen deficiency  E28.39 DX Hip/Pelvis/Spine   4. Encounter for screening mammogram for breast cancer  Z12.31 MA Screen Bilateral w/Sebas       Patient has been advised of split billing requirements and indicates understanding: No    COUNSELING:  Reviewed preventive health counseling, as reflected in patient instructions    Estimated body mass index is 20.09 kg/m  as calculated from the following:    Height as of this encounter: 1.613 m (5' 3.5\").    Weight as of this encounter: 52.3 kg (115 lb 3.2 oz).        She reports that she has never smoked. She has never used smokeless tobacco.    Appropriate preventive services were discussed with this patient, " including applicable screening as appropriate for cardiovascular disease, diabetes, osteopenia/osteoporosis, and glaucoma.  As appropriate for age/gender, discussed screening for colorectal cancer, prostate cancer, breast cancer, and cervical cancer. Checklist reviewing preventive services available has been given to the patient.    Reviewed patients plan of care and provided an AVS. The Basic Care Plan (routine screening as documented in Health Maintenance) for Wanda meets the Care Plan requirement. This Care Plan has been established and reviewed with the Patient.    Counseling Resources:  ATP IV Guidelines  Pooled Cohorts Equation Calculator  Breast Cancer Risk Calculator  BRCA-Related Cancer Risk Assessment: FHS-7 Tool  FRAX Risk Assessment  ICSI Preventive Guidelines  Dietary Guidelines for Americans, 2010  USDA's MyPlate  ASA Prophylaxis  Lung CA Screening    Kamryn Lopez MD  [facility] AND [facility]

## 2021-04-22 ENCOUNTER — MYC MEDICAL ADVICE (OUTPATIENT)
Dept: FAMILY MEDICINE | Facility: OTHER | Age: 66
End: 2021-04-22

## 2021-04-27 ENCOUNTER — HOSPITAL ENCOUNTER (OUTPATIENT)
Dept: MAMMOGRAPHY | Facility: OTHER | Age: 66
Discharge: HOME | End: 2021-04-27
Attending: FAMILY MEDICINE | Admitting: FAMILY MEDICINE
Payer: MEDICARE

## 2021-04-27 DIAGNOSIS — Z12.31 ENCOUNTER FOR SCREENING MAMMOGRAM FOR BREAST CANCER: ICD-10-CM

## 2021-04-27 PROCEDURE — 77063 BREAST TOMOSYNTHESIS BI: CPT

## 2021-05-17 ENCOUNTER — NURSE TRIAGE (OUTPATIENT)
Dept: FAMILY MEDICINE | Facility: OTHER | Age: 66
End: 2021-05-17

## 2021-05-17 ENCOUNTER — OFFICE VISIT (OUTPATIENT)
Dept: FAMILY MEDICINE | Facility: OTHER | Age: 66
End: 2021-05-17
Attending: FAMILY MEDICINE
Payer: COMMERCIAL

## 2021-05-17 VITALS
HEART RATE: 59 BPM | OXYGEN SATURATION: 99 % | DIASTOLIC BLOOD PRESSURE: 68 MMHG | WEIGHT: 116 LBS | RESPIRATION RATE: 18 BRPM | TEMPERATURE: 97.6 F | SYSTOLIC BLOOD PRESSURE: 138 MMHG | BODY MASS INDEX: 20.23 KG/M2
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
## 2021-05-17 DIAGNOSIS — S20.162A INSECT BITE OF LEFT BREAST, INITIAL ENCOUNTER: Primary | ICD-10-CM

## 2021-05-17 PROCEDURE — 99213 OFFICE O/P EST LOW 20 MIN: CPT | Performed by: FAMILY MEDICINE

## 2021-05-17 PROCEDURE — G0463 HOSPITAL OUTPT CLINIC VISIT: HCPCS

## 2021-05-17 ASSESSMENT — PAIN SCALES - GENERAL: PAINLEVEL: NO PAIN (0)

## 2021-05-17 NOTE — PROGRESS NOTES
SUBJECTIVE:   Wanda Crespo is a 66 year old female who presents to clinic today for the following health issues:    Patient presents with an insect bite that she noticed earlier today, with some mild irritation. No known tick exposures. Biggest risk would have been golfing.         OBJECTIVE:     /68 (BP Location: Right arm, Patient Position: Sitting, Cuff Size: Adult Regular)   Pulse 59   Temp 97.6  F (36.4  C) (Tympanic)   Resp 18   Wt 52.6 kg (116 lb)   LMP  (LMP Unknown)   SpO2 99%   BMI 20.23 kg/m    Body mass index is 20.23 kg/m .  Physical Exam  Constitutional:       Appearance: She is well-developed.   HENT:      Right Ear: External ear normal.      Left Ear: External ear normal.   Eyes:      General: No scleral icterus.     Conjunctiva/sclera: Conjunctivae normal.   Cardiovascular:      Rate and Rhythm: Normal rate.   Pulmonary:      Effort: Pulmonary effort is normal. No respiratory distress.   Skin:     Findings: No rash.      Comments: Small red macule with surrounding erythema.    Neurological:      Mental Status: She is alert.           ASSESSMENT/PLAN:           ICD-10-CM    1. Insect bite of left breast, initial encounter  S20.162A     W57.XXXA      In lesion consistent with an insect bite, mild inflammation.  No definitive tick exposure or known tick presence.  Would recommend watchful monitoring.  Handout provided for symptoms of early Lyme disease.  She will contact us with concerns.    Kamryn Lopez MD  [facility] AND [facility]

## 2021-05-17 NOTE — NURSING NOTE
"Patient here for an insect bite, not sure when she was bitten or by what.   Ling Cain LPN ..........5/17/2021 3:49 PM   Chief Complaint   Patient presents with     Insect Bite       Initial /68 (BP Location: Right arm, Patient Position: Sitting, Cuff Size: Adult Regular)   Pulse 59   Temp 97.6  F (36.4  C) (Tympanic)   Resp 18   Wt 52.6 kg (116 lb)   LMP  (LMP Unknown)   SpO2 99%   BMI 20.23 kg/m   Estimated body mass index is 20.23 kg/m  as calculated from the following:    Height as of 4/21/21: 1.613 m (5' 3.5\").    Weight as of this encounter: 52.6 kg (116 lb).  Medication Reconciliation: complete    Ling Cain LPN    "

## 2021-05-17 NOTE — TELEPHONE ENCOUNTER
Call to patient, verified  Name/.     S-(situation): unidentified insect bite, maybe a tick.     B-(background):  66 y.o. female.     A-(assessment):    Pt states was getting dressed this morning and noticed a red, circular rash on left breast.   Did not see the insect or tick that caused bite.  Itchy- mild itching.     Denies swelling, fevers.   Has not used home treatment such as hydrocortisone.     Pt expresses concerns, spouse had lyme's, was hospitalized and concerned. Pt verbalizes she wants to be seen. Primary has opening for this afternoon. Pt requesting to be seen this morning, very concerned.    R-(recommendations):  Pt wants to be seen, transferred to scheduling.    Shania Thompson RN ,....................  2021   8:55 AM

## 2021-06-17 ENCOUNTER — MYC MEDICAL ADVICE (OUTPATIENT)
Dept: FAMILY MEDICINE | Facility: OTHER | Age: 66
End: 2021-06-17

## 2021-06-17 ENCOUNTER — HOSPITAL ENCOUNTER (OUTPATIENT)
Dept: BONE DENSITY | Facility: OTHER | Age: 66
Discharge: HOME | End: 2021-06-17
Attending: FAMILY MEDICINE | Admitting: FAMILY MEDICINE
Payer: MEDICARE

## 2021-06-17 DIAGNOSIS — E28.39 ESTROGEN DEFICIENCY: ICD-10-CM

## 2021-06-17 PROCEDURE — 77080 DXA BONE DENSITY AXIAL: CPT

## 2021-09-03 ENCOUNTER — OFFICE VISIT (OUTPATIENT)
Dept: FAMILY MEDICINE | Facility: OTHER | Age: 66
End: 2021-09-03
Attending: FAMILY MEDICINE
Payer: MEDICARE

## 2021-09-03 VITALS
TEMPERATURE: 97.2 F | HEART RATE: 58 BPM | BODY MASS INDEX: 20.05 KG/M2 | WEIGHT: 115 LBS | OXYGEN SATURATION: 98 % | DIASTOLIC BLOOD PRESSURE: 82 MMHG | SYSTOLIC BLOOD PRESSURE: 138 MMHG

## 2021-09-03 DIAGNOSIS — M81.0 OSTEOPOROSIS, UNSPECIFIED OSTEOPOROSIS TYPE, UNSPECIFIED PATHOLOGICAL FRACTURE PRESENCE: Primary | ICD-10-CM

## 2021-09-03 LAB
ALBUMIN SERPL-MCNC: 4.3 G/DL (ref 3.5–5.7)
ALP SERPL-CCNC: 66 U/L (ref 34–104)
ALT SERPL W P-5'-P-CCNC: 25 U/L (ref 7–52)
ANION GAP SERPL CALCULATED.3IONS-SCNC: 2 MMOL/L (ref 3–14)
AST SERPL W P-5'-P-CCNC: 21 U/L (ref 13–39)
BILIRUB SERPL-MCNC: 0.4 MG/DL (ref 0.3–1)
BUN SERPL-MCNC: 14 MG/DL (ref 7–25)
CALCIUM SERPL-MCNC: 11.1 MG/DL (ref 8.6–10.3)
CHLORIDE BLD-SCNC: 103 MMOL/L (ref 98–107)
CO2 SERPL-SCNC: 32 MMOL/L (ref 21–31)
CREAT SERPL-MCNC: 0.79 MG/DL (ref 0.6–1.2)
DEPRECATED CALCIDIOL+CALCIFEROL SERPL-MC: 61 UG/L (ref 30–100)
GFR SERPL CREATININE-BSD FRML MDRD: 79 ML/MIN/1.73M2
GLUCOSE BLD-MCNC: 90 MG/DL (ref 70–105)
HOLD SPECIMEN: NORMAL
POTASSIUM BLD-SCNC: 4.1 MMOL/L (ref 3.5–5.1)
PROT SERPL-MCNC: 7.2 G/DL (ref 6.4–8.9)
PTH-INTACT SERPL-MCNC: 37 PG/ML (ref 12–88)
SODIUM SERPL-SCNC: 137 MMOL/L (ref 134–144)

## 2021-09-03 PROCEDURE — 36415 COLL VENOUS BLD VENIPUNCTURE: CPT | Mod: ZL | Performed by: FAMILY MEDICINE

## 2021-09-03 PROCEDURE — 83970 ASSAY OF PARATHORMONE: CPT | Mod: ZL | Performed by: FAMILY MEDICINE

## 2021-09-03 PROCEDURE — 80053 COMPREHEN METABOLIC PANEL: CPT | Mod: ZL | Performed by: FAMILY MEDICINE

## 2021-09-03 PROCEDURE — G0463 HOSPITAL OUTPT CLINIC VISIT: HCPCS

## 2021-09-03 PROCEDURE — 82306 VITAMIN D 25 HYDROXY: CPT | Mod: ZL | Performed by: FAMILY MEDICINE

## 2021-09-03 PROCEDURE — 99214 OFFICE O/P EST MOD 30 MIN: CPT | Performed by: FAMILY MEDICINE

## 2021-09-03 RX ORDER — ZINC GLUCONATE 50 MG
50 TABLET ORAL DAILY
COMMUNITY
End: 2024-02-08

## 2021-09-03 ASSESSMENT — PATIENT HEALTH QUESTIONNAIRE - PHQ9
SUM OF ALL RESPONSES TO PHQ QUESTIONS 1-9: 0
SUM OF ALL RESPONSES TO PHQ QUESTIONS 1-9: 0

## 2021-09-03 NOTE — NURSING NOTE
"Patient here to discuss bone density results.   Chief Complaint   Patient presents with     Results     bone density        Initial /82 (BP Location: Right arm, Patient Position: Sitting, Cuff Size: Adult Regular)   Pulse 58   Temp 97.2  F (36.2  C) (Tympanic)   Wt 52.2 kg (115 lb)   LMP  (LMP Unknown)   SpO2 98%   BMI 20.05 kg/m   Estimated body mass index is 20.05 kg/m  as calculated from the following:    Height as of 4/21/21: 1.613 m (5' 3.5\").    Weight as of this encounter: 52.2 kg (115 lb).  Medication Reconciliation: complete    Ling Cain, LPN  Clobetasol, she wasn't use for a bit as she was under the notion that she could use it as needed and recently has become itchy and she started using.  Advance Care Directive reviewed    "

## 2021-09-03 NOTE — PROGRESS NOTES
SUBJECTIVE:   Wanda Crespo is a 66 year old female who presents to clinic today for the following health issues:    HPI     Patient presents to review DEXA results.       OBJECTIVE:     /82 (BP Location: Right arm, Patient Position: Sitting, Cuff Size: Adult Regular)   Pulse 58   Temp 97.2  F (36.2  C) (Tympanic)   Wt 52.2 kg (115 lb)   LMP  (LMP Unknown)   SpO2 98%   BMI 20.05 kg/m    Body mass index is 20.05 kg/m .  Physical Exam  Constitutional:       Appearance: She is well-developed.   HENT:      Right Ear: External ear normal.      Left Ear: External ear normal.   Eyes:      General: No scleral icterus.     Conjunctiva/sclera: Conjunctivae normal.   Cardiovascular:      Rate and Rhythm: Normal rate.   Pulmonary:      Effort: Pulmonary effort is normal. No respiratory distress.   Skin:     Findings: No rash.   Neurological:      Mental Status: She is alert.         FINDINGS: Bone mineral density in the left hip measured 0.636 g/sq cm  with a T score of -3. Bone mineral density in the left femoral neck  measured 0.600 g/sq cm with a T score of -3.1. Bone mineral density in  the right hip measures 0.659 g/sq cm with a T score of -2.8. Bone  mineral density in the femoral neck measured 0.590 g/sq cm with a T  score of -3.2. Bone mineral density of the lumbar spine L1-L4 measured  0.713 g/sq cm with a T score of -4. The 10 year major osteoporotic  fracture risk is 16.8%. The 10 year fracture risk is 5.9%.    Answers for HPI/ROS submitted by the patient on 9/3/2021  PHQ9 TOTAL SCORE: 0        ASSESSMENT/PLAN:         ICD-10-CM    1. Osteoporosis, unspecified osteoporosis type, unspecified pathological fracture presence  M81.0 PTH, Intact     Vitamin D Total     Comprehensive Metabolic Panel     Comprehensive Metabolic Panel     Vitamin D Total     PTH, Intact     Reviewed treatment options and handouts provided.   Recommend weight bearing exercises.   Discussed calcium and Vit d supplementation  recommendations.   Treatment recommended, patient will consider and contact us when wanting to proceed.   Labs to evaluate for secondary causes, and in prep for possible Reclast.     30 min spent reviewing the chart, with the patient and documentation.     Kamryn Lopez MD  [facility] AND [facility]

## 2021-09-04 ASSESSMENT — PATIENT HEALTH QUESTIONNAIRE - PHQ9: SUM OF ALL RESPONSES TO PHQ QUESTIONS 1-9: 0

## 2021-09-07 ENCOUNTER — MYC MEDICAL ADVICE (OUTPATIENT)
Dept: FAMILY MEDICINE | Facility: OTHER | Age: 66
End: 2021-09-07

## 2021-09-07 DIAGNOSIS — E83.52 HYPERCALCEMIA: Primary | ICD-10-CM

## 2021-09-17 ENCOUNTER — TELEPHONE (OUTPATIENT)
Dept: FAMILY MEDICINE | Facility: OTHER | Age: 66
End: 2021-09-17

## 2021-09-17 NOTE — TELEPHONE ENCOUNTER
Yes. She only has to avoid taking calcium and any other vitamins that contain calcium. Kamryn Lopez MD

## 2021-09-17 NOTE — TELEPHONE ENCOUNTER
Was wondering about going off the calcium and Multivitamin for a month for the test. She is wondering if she can still take her Vitamin D, zinc, Fish oil?   Ling Cain LPN ..........9/17/2021 1:17 PM

## 2021-10-06 ENCOUNTER — OFFICE VISIT (OUTPATIENT)
Dept: FAMILY MEDICINE | Facility: OTHER | Age: 66
End: 2021-10-06
Attending: FAMILY MEDICINE
Payer: MEDICARE

## 2021-10-06 ENCOUNTER — LAB REQUISITION (OUTPATIENT)
Dept: LAB | Facility: OTHER | Age: 66
End: 2021-10-06
Payer: COMMERCIAL

## 2021-10-06 VITALS
WEIGHT: 116.6 LBS | OXYGEN SATURATION: 99 % | HEART RATE: 56 BPM | RESPIRATION RATE: 18 BRPM | TEMPERATURE: 96 F | DIASTOLIC BLOOD PRESSURE: 62 MMHG | BODY MASS INDEX: 20.33 KG/M2 | SYSTOLIC BLOOD PRESSURE: 132 MMHG

## 2021-10-06 DIAGNOSIS — E83.52 HYPERCALCEMIA: ICD-10-CM

## 2021-10-06 DIAGNOSIS — E61.1 IRON DEFICIENCY: ICD-10-CM

## 2021-10-06 PROBLEM — M81.0 OSTEOPOROSIS: Status: ACTIVE | Noted: 2021-10-06

## 2021-10-06 LAB
CA-I BLD-MCNC: 5.4 MG/DL (ref 4.4–5.2)
CALCIUM SERPL-MCNC: 10.1 MG/DL (ref 8.6–10.3)

## 2021-10-06 PROCEDURE — 36415 COLL VENOUS BLD VENIPUNCTURE: CPT | Mod: ZL | Performed by: FAMILY MEDICINE

## 2021-10-06 PROCEDURE — G0463 HOSPITAL OUTPT CLINIC VISIT: HCPCS

## 2021-10-06 PROCEDURE — 82330 ASSAY OF CALCIUM: CPT | Mod: ZL | Performed by: FAMILY MEDICINE

## 2021-10-06 PROCEDURE — 82310 ASSAY OF CALCIUM: CPT | Mod: ZL | Performed by: FAMILY MEDICINE

## 2021-10-06 PROCEDURE — 99213 OFFICE O/P EST LOW 20 MIN: CPT | Performed by: FAMILY MEDICINE

## 2021-10-06 ASSESSMENT — PATIENT HEALTH QUESTIONNAIRE - PHQ9
SUM OF ALL RESPONSES TO PHQ QUESTIONS 1-9: 0
SUM OF ALL RESPONSES TO PHQ QUESTIONS 1-9: 0

## 2021-10-06 ASSESSMENT — PAIN SCALES - GENERAL: PAINLEVEL: NO PAIN (0)

## 2021-10-06 NOTE — PROGRESS NOTES
SUBJECTIVE:   Wanda Crespo is a 66 year old female who presents to clinic today for the following health issues:    Patient presents for follow-up on hypercalcemia.  Calcium level was recently elevated.  She was asked to stop all calcium supplements and presents today for recheck.  She does have osteoporosis.      OBJECTIVE:     /62 (BP Location: Right arm, Patient Position: Sitting, Cuff Size: Adult Regular)   Pulse 56   Temp (!) 96  F (35.6  C) (Tympanic)   Resp 18   Wt 52.9 kg (116 lb 9.6 oz)   LMP  (LMP Unknown)   SpO2 99%   BMI 20.33 kg/m    Body mass index is 20.33 kg/m .  Physical Exam  Constitutional:       Appearance: She is well-developed.   HENT:      Right Ear: External ear normal.      Left Ear: External ear normal.   Eyes:      General: No scleral icterus.     Conjunctiva/sclera: Conjunctivae normal.   Cardiovascular:      Rate and Rhythm: Normal rate.   Pulmonary:      Effort: Pulmonary effort is normal. No respiratory distress.   Skin:     Findings: No rash.   Neurological:      Mental Status: She is alert.         Diagnostic Test Results:  Results for orders placed or performed in visit on 10/06/21   Calcium     Status: Normal   Result Value Ref Range    Calcium 10.1 8.6 - 10.3 mg/dL   Ionized Calcium     Status: Abnormal   Result Value Ref Range    Calcium Ionized 5.4 (H) 4.4 - 5.2 mg/dL       Answers for HPI/ROS submitted by the patient on 10/6/2021  PHQ9 TOTAL SCORE: 0        ASSESSMENT/PLAN:       ICD-10-CM    1. Hypercalcemia  E83.52 Calcium     Ionized Calcium     Calcium level has normalized following cessation of exaggerated calcium.  Ionized calcium is just mildly elevated.  Parathyroid hormone was previously checked and normal, as was vitamin D.  No further work-up recommended for hypercalcemia.  Patient can resume calcium supplementation for treatment of osteoporosis.    Kamryn Lopez MD  [facility] AND [facility]

## 2021-10-07 ASSESSMENT — PATIENT HEALTH QUESTIONNAIRE - PHQ9: SUM OF ALL RESPONSES TO PHQ QUESTIONS 1-9: 0

## 2021-11-30 ENCOUNTER — ALLIED HEALTH/NURSE VISIT (OUTPATIENT)
Dept: FAMILY MEDICINE | Facility: OTHER | Age: 66
End: 2021-11-30
Attending: FAMILY MEDICINE
Payer: MEDICARE

## 2021-11-30 DIAGNOSIS — Z20.822 COVID-19 RULED OUT: Primary | ICD-10-CM

## 2021-11-30 PROCEDURE — C9803 HOPD COVID-19 SPEC COLLECT: HCPCS

## 2021-11-30 PROCEDURE — U0005 INFEC AGEN DETEC AMPLI PROBE: HCPCS | Mod: ZL

## 2021-12-02 LAB — SARS-COV-2 RNA RESP QL NAA+PROBE: POSITIVE

## 2022-01-23 ENCOUNTER — HEALTH MAINTENANCE LETTER (OUTPATIENT)
Age: 67
End: 2022-01-23

## 2022-07-10 ENCOUNTER — HEALTH MAINTENANCE LETTER (OUTPATIENT)
Age: 67
End: 2022-07-10

## 2022-08-01 ENCOUNTER — OFFICE VISIT (OUTPATIENT)
Dept: FAMILY MEDICINE | Facility: OTHER | Age: 67
End: 2022-08-01
Attending: PHYSICIAN ASSISTANT
Payer: COMMERCIAL

## 2022-08-01 VITALS
RESPIRATION RATE: 16 BRPM | OXYGEN SATURATION: 98 % | BODY MASS INDEX: 20.02 KG/M2 | DIASTOLIC BLOOD PRESSURE: 64 MMHG | HEART RATE: 68 BPM | TEMPERATURE: 96.8 F | SYSTOLIC BLOOD PRESSURE: 126 MMHG | WEIGHT: 114.8 LBS

## 2022-08-01 DIAGNOSIS — L90.0 LICHEN SCLEROSUS ET ATROPHICUS: ICD-10-CM

## 2022-08-01 DIAGNOSIS — L72.3 INFLAMED SEBACEOUS CYST: Primary | ICD-10-CM

## 2022-08-01 PROCEDURE — 99213 OFFICE O/P EST LOW 20 MIN: CPT | Performed by: PHYSICIAN ASSISTANT

## 2022-08-01 PROCEDURE — G0463 HOSPITAL OUTPT CLINIC VISIT: HCPCS

## 2022-08-01 RX ORDER — CLOBETASOL PROPIONATE 0.5 MG/G
CREAM TOPICAL 2 TIMES DAILY
Qty: 45 G | Refills: 2 | Status: SHIPPED | OUTPATIENT
Start: 2022-08-01 | End: 2024-03-04

## 2022-08-01 RX ORDER — SULFAMETHOXAZOLE/TRIMETHOPRIM 800-160 MG
1 TABLET ORAL 2 TIMES DAILY
Qty: 20 TABLET | Refills: 0 | Status: SHIPPED | OUTPATIENT
Start: 2022-08-01 | End: 2022-08-11

## 2022-08-01 RX ORDER — PREDNISOLONE ACETATE 10 MG/ML
SUSPENSION/ DROPS OPHTHALMIC
COMMUNITY
Start: 2022-03-28 | End: 2024-02-08

## 2022-08-01 ASSESSMENT — PAIN SCALES - GENERAL: PAINLEVEL: NO PAIN (0)

## 2022-08-01 NOTE — PROGRESS NOTES
Assessment & Plan   Problem List Items Addressed This Visit    None     Visit Diagnoses     Inflamed sebaceous cyst    -  Primary    Relevant Medications    prednisoLONE acetate (PRED FORTE) 1 % ophthalmic suspension    clobetasol (TEMOVATE) 0.05 % external cream    sulfamethoxazole-trimethoprim (BACTRIM DS) 800-160 MG tablet    Lichen sclerosus et atrophicus        Relevant Medications    clobetasol (TEMOVATE) 0.05 % external cream         Refilled clobetasol cream with history of lichen sclerosis.    Inflamed sebaceous cyst: No I&D is warranted at this time.  Patient was started on Bactrim for treatment.  Encouraged warm compresses.  Encouraged to take ibuprofen for relief up to 4 times per day.  Encouraged to use heat 15 minutes at a time several times per day to decrease pain. Return to clinic with any change or worsening of symptoms.   Call for an OB/GYN consult if needed.        See Patient Instructions    Return if symptoms worsen or fail to improve.    Candice Dalal PA-C  [facility]    Marcella Muñoz is a 67 year old, presenting for the following health issues:  Mass (Boil near perineal  area)      Mass    History of Present Illness       Reason for visit:  Bump/boil. Burning    She eats 2-3 servings of fruits and vegetables daily.She consumes 1 sweetened beverage(s) daily.She exercises with enough effort to increase her heart rate 20 to 29 minutes per day.  She exercises with enough effort to increase her heart rate 4 days per week.   She is taking medications regularly.       Skin bump/boil near perineal/anal area that the patient noticed 4-5 days ago, she states it burns when she urinates.  It is pinky size.  Started to have some pain with wiping.  No drainage.  Hot washcloth helped.  Grew up with history of having boils when she was younger.  No dysuria, hematuria, fevers, chills.    Review of Systems   Constitutional, HEENT, cardiovascular, pulmonary, gi and gu systems are  negative, except as otherwise noted.      Objective    /64 (BP Location: Right arm, Patient Position: Sitting, Cuff Size: Adult Regular)   Pulse 68   Temp 96.8  F (36  C) (Tympanic)   Resp 16   Wt 52.1 kg (114 lb 12.8 oz)   LMP  (LMP Unknown)   SpO2 98%   Breastfeeding No   BMI 20.02 kg/m    Body mass index is 20.02 kg/m .  Physical Exam  Vitals and nursing note reviewed.   Constitutional:       Appearance: Normal appearance.   HENT:      Head: Normocephalic and atraumatic.   Eyes:      Extraocular Movements: Extraocular movements intact.      Conjunctiva/sclera: Conjunctivae normal.      Pupils: Pupils are equal, round, and reactive to light.   Cardiovascular:      Rate and Rhythm: Normal rate and regular rhythm.      Heart sounds: Normal heart sounds.   Pulmonary:      Effort: Pulmonary effort is normal.      Breath sounds: Normal breath sounds.   Genitourinary:     General: Normal vulva.      Comments: Mildly erythematous, mildly tender lump approximately pea-sized appreciated on the right posterior labia, closer to the rectum.  No open wound, pus, drainage, warmth.    Musculoskeletal:         General: Normal range of motion.   Skin:     General: Skin is warm and dry.   Neurological:      General: No focal deficit present.      Mental Status: She is alert and oriented to person, place, and time.   Psychiatric:         Mood and Affect: Mood normal.         Behavior: Behavior normal.     No I&D is warranted at this time.            .  ..

## 2022-08-01 NOTE — PATIENT INSTRUCTIONS
Encouraged to take ibuprofen for relief up to 4 times per day.  Encouraged to use heat 15 minutes at a time several times per day to decrease pain. Return to clinic with any change or worsening of symptoms.   Call for an OB/GYN consult if needed.

## 2022-08-01 NOTE — NURSING NOTE
Pt presents to clinic today for a bump/boil near the patients perineal area. Pt states it burns when she urinates and the bump is nesr the anus.       FOOD SECURITY SCREENING QUESTIONS:    The next two questions are to help us understand your food security.  If you are feeling you need any assistance in this area, we have resources available to support you today.    Hunger Vital Signs:  Within the past 12 months we worried whether our food would run out before we got money to buy more. Never  Within the past 12 months the food we bought just didn't last and we didn't have money to get more. Never        Medication Reconciliation: complete  Dada Alfaro, LENI,LPN on 8/1/2022 at 1:12 PM

## 2022-09-11 ENCOUNTER — HEALTH MAINTENANCE LETTER (OUTPATIENT)
Age: 67
End: 2022-09-11

## 2023-01-10 ENCOUNTER — HOSPITAL ENCOUNTER (OUTPATIENT)
Dept: GENERAL RADIOLOGY | Facility: OTHER | Age: 68
Discharge: HOME | End: 2023-01-10
Attending: PHYSICIAN ASSISTANT
Payer: MEDICARE

## 2023-01-10 ENCOUNTER — OFFICE VISIT (OUTPATIENT)
Dept: FAMILY MEDICINE | Facility: OTHER | Age: 68
End: 2023-01-10
Attending: PHYSICIAN ASSISTANT
Payer: MEDICARE

## 2023-01-10 VITALS
SYSTOLIC BLOOD PRESSURE: 138 MMHG | HEART RATE: 93 BPM | OXYGEN SATURATION: 93 % | WEIGHT: 114.2 LBS | RESPIRATION RATE: 20 BRPM | DIASTOLIC BLOOD PRESSURE: 68 MMHG | TEMPERATURE: 99.9 F | BODY MASS INDEX: 19.91 KG/M2

## 2023-01-10 DIAGNOSIS — R09.89 CHEST CONGESTION: Primary | ICD-10-CM

## 2023-01-10 DIAGNOSIS — J18.9 COMMUNITY ACQUIRED PNEUMONIA OF LEFT LOWER LOBE OF LUNG: ICD-10-CM

## 2023-01-10 DIAGNOSIS — R09.89 CHEST CONGESTION: ICD-10-CM

## 2023-01-10 PROCEDURE — G0463 HOSPITAL OUTPT CLINIC VISIT: HCPCS

## 2023-01-10 PROCEDURE — 99213 OFFICE O/P EST LOW 20 MIN: CPT | Performed by: PHYSICIAN ASSISTANT

## 2023-01-10 PROCEDURE — G0463 HOSPITAL OUTPT CLINIC VISIT: HCPCS | Mod: 25

## 2023-01-10 PROCEDURE — 71046 X-RAY EXAM CHEST 2 VIEWS: CPT

## 2023-01-10 RX ORDER — AZITHROMYCIN 250 MG/1
TABLET, FILM COATED ORAL
Qty: 6 TABLET | Refills: 0 | Status: SHIPPED | OUTPATIENT
Start: 2023-01-10 | End: 2023-01-15

## 2023-01-10 RX ORDER — GUAIFENESIN 600 MG/1
1200 TABLET, EXTENDED RELEASE ORAL 2 TIMES DAILY
Qty: 20 TABLET | Refills: 0 | Status: SHIPPED | OUTPATIENT
Start: 2023-01-10 | End: 2023-01-15

## 2023-01-10 RX ORDER — PREDNISONE 20 MG/1
40 TABLET ORAL DAILY
Qty: 10 TABLET | Refills: 0 | Status: SHIPPED | OUTPATIENT
Start: 2023-01-10 | End: 2023-01-15

## 2023-01-10 ASSESSMENT — ENCOUNTER SYMPTOMS
MYALGIAS: 1
EYE PAIN: 0
VOMITING: 0
FEVER: 1
RHINORRHEA: 1
SINUS PRESSURE: 0
DIFFICULTY URINATING: 0
FREQUENCY: 0
ARTHRALGIAS: 0
VOICE CHANGE: 1
TROUBLE SWALLOWING: 0
FATIGUE: 1
SINUS PAIN: 0
WHEEZING: 0
SHORTNESS OF BREATH: 0
CONSTIPATION: 0
ABDOMINAL PAIN: 0
STRIDOR: 0
SORE THROAT: 0
APPETITE CHANGE: 1
DIARRHEA: 0
COUGH: 1
EYE REDNESS: 0
ACTIVITY CHANGE: 1
NAUSEA: 0

## 2023-01-10 ASSESSMENT — PAIN SCALES - GENERAL: PAINLEVEL: EXTREME PAIN (8)

## 2023-01-10 NOTE — NURSING NOTE
"Chief Complaint   Patient presents with     Cough       Initial /68   Pulse 93   Temp 99.9  F (37.7  C) (Tympanic)   Resp 20   Wt 51.8 kg (114 lb 3.2 oz)   LMP  (LMP Unknown)   SpO2 93%   BMI 19.91 kg/m   Estimated body mass index is 19.91 kg/m  as calculated from the following:    Height as of 4/21/21: 1.613 m (5' 3.5\").    Weight as of this encounter: 51.8 kg (114 lb 3.2 oz).  Medication Reconciliation: complete    FOOD SECURITY SCREENING QUESTIONS  Hunger Vital Signs:  Within the past 12 months we worried whether our food would run out before we got money to buy more. Never  Within the past 12 months the food we bought just didn't last and we didn't have money to get more. Never  Candice Baker LPN 1/10/2023 10:52 AM      "

## 2023-01-10 NOTE — PROGRESS NOTES
Assessment & Plan     1. Community acquired pneumonia of left lower lobe of lung  - azithromycin (ZITHROMAX) 250 MG tablet; Take 2 tablets (500 mg) by mouth daily for 1 day, THEN 1 tablet (250 mg) daily for 4 days.  Dispense: 6 tablet; Refill: 0  - predniSONE (DELTASONE) 20 MG tablet; Take 2 tablets (40 mg) by mouth daily for 5 days  Dispense: 10 tablet; Refill: 0  - guaiFENesin (MUCINEX) 600 MG 12 hr tablet; Take 2 tablets (1,200 mg) by mouth 2 times daily for 5 days  Dispense: 20 tablet; Refill: 0  - amoxicillin-clavulanate (AUGMENTIN) 875-125 MG tablet; Take 1 tablet by mouth 2 times daily for 7 days  Dispense: 14 tablet; Refill: 0  - Vital signs stable. PE consistent with URI. Testing results were as follows: left lower lobe consolidation.     Recommend: increased fluids, rest, use of humidifier, antitussives (cough medication for adults), alternating tylenol and ibuprofen every 4-6 hours as needed (if able to take these medications), salt water gargles for sore throats or throat lozenges, honey, netti pots/saline rinses for sinus/congestion, as well as other home remedies.     If worsening fevers, chills, uncontrollable nausea/vomiting, dehydration,etc., or other worsening signs occur, patient is agreeable to follow up for reevaluation.     Patient is in agreement and understanding of the above treatment plan. All questions and concerns were addressed and answered to patient's satisfaction. AVS reviewed with patient.     2. Chest congestion  - XR Chest 2 Views; Future  - Left lower lobe consolidation noted, which is consistent with physical examination findings today.                  No follow-ups on file.    Iveth Jaime PA-C  [facility]   Wanda is a 67 year old, presenting for the following health issues:  Cough      Cough  Associated symptoms include rhinorrhea and myalgias. Pertinent negatives include no ear pain, no sore throat, no shortness of breath, no  wheezing and no eye redness.   History of Present Illness       Reason for visit:  It seems to get worse hard to cough,chest hurts  Symptom onset:  1-2 weeks ago  Symptom intensity:  Moderate  Symptom progression:  Worsening  Had these symptoms before:  No  What makes it worse:  Coughing  What makes it better:  Sleep    She eats 2-3 servings of fruits and vegetables daily.She consumes 0 sweetened beverage(s) daily.She exercises with enough effort to increase her heart rate 30 to 60 minutes per day.  She exercises with enough effort to increase her heart rate 5 days per week.   She is taking medications regularly.     She notes that her symptom started on 12/29/2022.  Unknown of any sick exposures.  Since this timeframe her symptoms been waxing and waning, she has intermittent fevers which have lasted 2 days at most and not exceeded 100  F.  She does endorse an intermittent dry and productive cough, when the cough is productive she coughs up green phlegm.  She has clear rhinorrhea.  No sore throat.  Voice is diminished due to drainage per her report.  She has utilized Tylenol and increasing her water intake, she has not taken anything for the cough at this time otherwise.  Appetite is mildly decreased.  Decreased sleep at night due to cough, she reports that if she lays down the cough is worse.    Review of Systems   Constitutional: Positive for activity change, appetite change, fatigue and fever.   HENT: Positive for congestion, postnasal drip, rhinorrhea and voice change (diminished). Negative for drooling, ear discharge, ear pain, nosebleeds, sinus pressure, sinus pain, sneezing, sore throat, tinnitus and trouble swallowing.    Eyes: Negative for pain and redness.   Respiratory: Positive for cough. Negative for shortness of breath, wheezing and stridor.    Gastrointestinal: Negative for abdominal pain, constipation, diarrhea, nausea and vomiting.   Genitourinary: Negative for decreased urine volume, difficulty  urinating, frequency and urgency.   Musculoskeletal: Positive for myalgias. Negative for arthralgias.   Skin: Negative for pallor and rash.   All other systems reviewed and are negative.     Past Medical History:   Diagnosis Date     Activity, other specified     Usual childhood     Adjustment disorder     one son  in a drowning 2 years ago.     Other constipation     No Comments Provided     Personal history of other medical treatment (CODE)     G4, P4     Past Surgical History:   Procedure Laterality Date     COLONOSCOPY  2010    F/U 2020, hyperplastic polyps     Social History     Tobacco Use     Smoking status: Never     Smokeless tobacco: Never   Substance Use Topics     Alcohol use: No     Current Outpatient Medications   Medication Sig Dispense Refill     calcium carbonate (OSCAL 500) 1250 (500 CA) MG TABS tablet Take 1,250 mg by mouth 3 times daily (with meals) Take 1 tablet by mouth 3 times daily with meals.       Cholecalciferol (VITAMIN D3) 2000 UNITS CAPS Take 2,000 Units by mouth daily Take 1 capsule by mouth once daily       clobetasol (TEMOVATE) 0.05 % external cream Apply topically 2 times daily 45 g 2     Multiple Vitamin (MULTI-VITAMINS) TABS Take 1 tablet by mouth daily Take 1 tablet by mouth once daily       omega 3 1200 MG CAPS Take 1,250 mg by mouth 2 times daily Take 1 capsule by mouth 2 times daily       timolol maleate (TIMOPTIC) 0.5 % ophthalmic solution INSTILL 1 DROP INTO EACH EYE TWICE DAILY       zinc gluconate 50 MG tablet Take 50 mg by mouth daily       prednisoLONE acetate (PRED FORTE) 1 % ophthalmic suspension SHAKE LIQUID AND INSTILL 1 DROP IN LEFT EYE EVERY HOUR WHILE AWAKE AS DIRECTED (Patient not taking: Reported on 1/10/2023)       No Known Allergies      Objective    /68   Pulse 93   Temp 99.9  F (37.7  C) (Tympanic)   Resp 20   Wt 51.8 kg (114 lb 3.2 oz)   LMP  (LMP Unknown)   SpO2 93%   BMI 19.91 kg/m    Body mass index is 19.91 kg/m .  Physical  Exam   GENERAL: healthy, alert and no distress  NECK: no adenopathy, no asymmetry, masses, or scars and thyroid normal to palpation  RESP: no rales , no rhonchi, no wheezes and very minimal decreased breath sounds L lower posterior  CV: regular rate and rhythm, normal S1 S2, no S3 or S4, no murmur, click or rub, no peripheral edema and peripheral pulses strong  ABDOMEN: soft, nontender, no hepatosplenomegaly, no masses and bowel sounds normal  MS: no gross musculoskeletal defects noted, no edema  SKIN: no suspicious lesions or rashes  NEURO: Normal strength and tone, mentation intact and speech normal  PSYCH: mentation appears normal, affect normal/bright  LYMPH: normal ant/post cervical, supraclavicular nodes    Results for orders placed or performed during the hospital encounter of 01/10/23   XR Chest 2 Views     Status: None    Narrative    PROCEDURE:  XR CHEST 2 VIEWS    HISTORY: Chest congestion    COMPARISON:  None.    FINDINGS: PA and lateral chest radiographs  Cardiomediastinal silhouette is within normal limits.  No effusion or pneumothorax. Streaky left lower lobe opacities.    No suspicious osseous lesion or subdiaphragmatic free air.      Impression    IMPRESSION:    Streaky left lower lobe opacities, atelectasis versus infection.      ISACC PIERRE MD         SYSTEM ID:  [id number]

## 2023-01-10 NOTE — PATIENT INSTRUCTIONS
Please refer to your AVS for follow up and pain/symptoms management recommendations (I.e.: medications, helpful conservative treatment modalities, appropriate follow up if need to a specialist or family practice, etc.). Please return to urgent care if your symptoms change or worsen.     Discharge instructions:  -If you were prescribed a medication(s), please take this as prescribed/directed  -Monitor your symptoms, if changing/worsening, return to UC/ER or PCP for follow up    For pain control - we recommend alternating Tylenol and Ibuprofen if you are able to take these medications. Alternate every 4 hours as needed. I.e.: Ibuprofen at 8am, Tylenol 12pm, Ibuprofen 4pm    -Daily maximum of Tylenol is 4000mg (recommend staying under 3000mg)   -Daily maximum of Ibuprofen is 3200 mg  - Heat, ice, gentle stretching (among other remedies: biofreeze/icy hot, etc).     Symptomatic treatments recommended.  - Antibiotics will not help with your symptoms, unless you were told otherwise today (strep throat, ear infection, etc. ). Education provided on symptoms of secondary bacterial infection such as new fever, chills, rigors, shortness of breath, increased work of breathing, that can occur with viral URI and need for further evaluation, if they occur.   - Ensure you are staying hydrated by drinking plenty of fluids and eating mild foods and advance diet as tolerated  - Honey can be soothing for sore throat (as long as above 12 months of age)  - Warm salt water gurgles can help soothe sore throat  - Humidifier can help with congestion and help keep mucus membranes such as throat and nose from drying out.  - Sleeping slightly propped up can help with congestion and postnasal drainage that can worsen cough at bedtime.  - As long as you have never been told to take Tylenol and/or Ibuprofen you can use them to manage fever and body aches per package instructions  Make sure you eat when you take ibuprofen to avoid stomach upset.  -  OTC cough medications per package instructions to help with cough. Check to see if the cough/cold medication already has acetaminophen (Tylenol) in it. If it does avoid taking additional Tylenol.  - If sudden onset of new fever, worsening symptoms return for further evaluation.  - OTC antihistamine such as Allegra, Zyrtec, Claritin (generic is okay) can help with nasal/sinus congestion and OTC nasal steroid such as Flonase can help decrease sinus inflammation to help with congestion.  - Education provided on symptoms of post-viral bacterial infections including ear infection and pneumonia. This would require re-evaluation for treatment.

## 2023-01-12 ENCOUNTER — OFFICE VISIT (OUTPATIENT)
Dept: FAMILY MEDICINE | Facility: OTHER | Age: 68
End: 2023-01-12
Attending: FAMILY MEDICINE
Payer: COMMERCIAL

## 2023-01-12 VITALS
RESPIRATION RATE: 18 BRPM | HEIGHT: 63 IN | WEIGHT: 114.8 LBS | DIASTOLIC BLOOD PRESSURE: 70 MMHG | BODY MASS INDEX: 20.34 KG/M2 | TEMPERATURE: 96.2 F | SYSTOLIC BLOOD PRESSURE: 112 MMHG | OXYGEN SATURATION: 99 % | HEART RATE: 71 BPM

## 2023-01-12 DIAGNOSIS — E78.5 DYSLIPIDEMIA: ICD-10-CM

## 2023-01-12 DIAGNOSIS — R73.01 IFG (IMPAIRED FASTING GLUCOSE): ICD-10-CM

## 2023-01-12 DIAGNOSIS — M81.0 OSTEOPOROSIS, UNSPECIFIED OSTEOPOROSIS TYPE, UNSPECIFIED PATHOLOGICAL FRACTURE PRESENCE: Primary | ICD-10-CM

## 2023-01-12 DIAGNOSIS — Z00.00 HEALTH CARE MAINTENANCE: ICD-10-CM

## 2023-01-12 DIAGNOSIS — L98.9 SKIN LESION: ICD-10-CM

## 2023-01-12 LAB
ANION GAP SERPL CALCULATED.3IONS-SCNC: 5 MMOL/L (ref 7–15)
BUN SERPL-MCNC: 9.1 MG/DL (ref 8–23)
CALCIUM SERPL-MCNC: 10.3 MG/DL (ref 8.8–10.2)
CHLORIDE SERPL-SCNC: 102 MMOL/L (ref 98–107)
CHOLEST SERPL-MCNC: 165 MG/DL
CREAT SERPL-MCNC: 0.66 MG/DL (ref 0.51–0.95)
DEPRECATED HCO3 PLAS-SCNC: 29 MMOL/L (ref 22–29)
GFR SERPL CREATININE-BSD FRML MDRD: >90 ML/MIN/1.73M2
GLUCOSE SERPL-MCNC: 94 MG/DL (ref 70–99)
HBA1C MFR BLD: 5.7 % (ref 4–6.2)
HDLC SERPL-MCNC: 45 MG/DL
LDLC SERPL CALC-MCNC: 103 MG/DL
NONHDLC SERPL-MCNC: 120 MG/DL
POTASSIUM SERPL-SCNC: 4.2 MMOL/L (ref 3.4–5.3)
SODIUM SERPL-SCNC: 136 MMOL/L (ref 136–145)
TRIGL SERPL-MCNC: 87 MG/DL

## 2023-01-12 PROCEDURE — 36415 COLL VENOUS BLD VENIPUNCTURE: CPT | Mod: ZL | Performed by: FAMILY MEDICINE

## 2023-01-12 PROCEDURE — G0439 PPPS, SUBSEQ VISIT: HCPCS | Performed by: FAMILY MEDICINE

## 2023-01-12 PROCEDURE — 80061 LIPID PANEL: CPT | Mod: ZL | Performed by: FAMILY MEDICINE

## 2023-01-12 PROCEDURE — 82310 ASSAY OF CALCIUM: CPT | Mod: ZL | Performed by: FAMILY MEDICINE

## 2023-01-12 PROCEDURE — G0463 HOSPITAL OUTPT CLINIC VISIT: HCPCS

## 2023-01-12 PROCEDURE — 83036 HEMOGLOBIN GLYCOSYLATED A1C: CPT | Mod: ZL | Performed by: FAMILY MEDICINE

## 2023-01-12 ASSESSMENT — ENCOUNTER SYMPTOMS
BREAST MASS: 0
SHORTNESS OF BREATH: 0
EYE PAIN: 0
HEADACHES: 0
FREQUENCY: 0
DIARRHEA: 1
HEARTBURN: 0
SORE THROAT: 0
WEAKNESS: 0
PALPITATIONS: 0
DYSURIA: 0
CHILLS: 0
NERVOUS/ANXIOUS: 0
HEMATURIA: 0
MYALGIAS: 0
NAUSEA: 0
ARTHRALGIAS: 0
CONSTIPATION: 0
FEVER: 0
JOINT SWELLING: 0
PARESTHESIAS: 0
ABDOMINAL PAIN: 0
COUGH: 1
DIZZINESS: 0
HEMATOCHEZIA: 0

## 2023-01-12 ASSESSMENT — ACTIVITIES OF DAILY LIVING (ADL): CURRENT_FUNCTION: NO ASSISTANCE NEEDED

## 2023-01-12 ASSESSMENT — PATIENT HEALTH QUESTIONNAIRE - PHQ9
SUM OF ALL RESPONSES TO PHQ QUESTIONS 1-9: 0
SUM OF ALL RESPONSES TO PHQ QUESTIONS 1-9: 0

## 2023-01-12 NOTE — NURSING NOTE
"Chief Complaint   Patient presents with     Wellness Visit     Here for yearly physical, wellness visit.        Initial /70 (BP Location: Right arm, Patient Position: Sitting, Cuff Size: Adult Regular)   Pulse 71   Temp (!) 96.2  F (35.7  C) (Tympanic)   Resp 18   Ht 1.6 m (5' 3\")   Wt 52.1 kg (114 lb 12.8 oz)   LMP  (LMP Unknown)   SpO2 99%   BMI 20.34 kg/m   Estimated body mass index is 20.34 kg/m  as calculated from the following:    Height as of this encounter: 1.6 m (5' 3\").    Weight as of this encounter: 52.1 kg (114 lb 12.8 oz).  Medication Reconciliation: complete    Ling Cain LPN    Advance Care Directive reviewed    "

## 2023-01-12 NOTE — PROGRESS NOTES
"SUBJECTIVE:   Wanda is a 67 year old who presents for Preventive Visit.    Patient has been advised of split billing requirements and indicates understanding: Yes  Are you in the first 12 months of your Medicare coverage?  No    Healthy Habits:     In general, how would you rate your overall health?  Good    Frequency of exercise:  2-3 days/week    Duration of exercise:  30-45 minutes    Do you usually eat at least 4 servings of fruit and vegetables a day, include whole grains    & fiber and avoid regularly eating high fat or \"junk\" foods?  Yes    Taking medications regularly:  Yes    Medication side effects:  Other    Ability to successfully perform activities of daily living:  No assistance needed    Home Safety:  No safety concerns identified    Hearing Impairment:  No hearing concerns    In the past 6 months, have you been bothered by leaking of urine?  No    In general, how would you rate your overall mental or emotional health?  Excellent      PHQ-2 Total Score: 0    Additional concerns today:  No      The 10-year ASCVD risk score (Benjamín DELGADO, et al., 2019) is: 4.7%    Values used to calculate the score:      Age: 67 years      Sex: Female      Is Non- : No      Diabetic: No      Tobacco smoker: No      Systolic Blood Pressure: 112 mmHg      Is BP treated: No      HDL Cholesterol: 45 mg/dL      Total Cholesterol: 165 mg/dL      Have you ever done Advance Care Planning? (For example, a Health Directive, POLST, or a discussion with a medical provider or your loved ones about your wishes): No, advance care planning information given to patient to review.  Patient plans to discuss their wishes with loved ones or provider.         Fall risk  Fallen 2 or more times in the past year?: No  Any fall with injury in the past year?: No    Cognitive Screening   1) Repeat 3 items (Leader, Season, Table)    2) Clock draw: NORMAL  3) 3 item recall: Recalls 3 objects  Results: 3 items recalled: " COGNITIVE IMPAIRMENT LESS LIKELY    Mini-CogTM Copyright PAULY Grimes. Licensed by the author for use in [facility]; reprinted with permission ([email or web address]@.[facility]). All rights reserved.      Do you have sleep apnea, excessive snoring or daytime drowsiness?: no    Reviewed and updated as needed this visit by clinical staff   Tobacco  Allergies  Meds              Reviewed and updated as needed this visit by Provider                 Social History     Tobacco Use     Smoking status: Never     Smokeless tobacco: Never   Substance Use Topics     Alcohol use: No         Alcohol Use 1/12/2023   Prescreen: >3 drinks/day or >7 drinks/week? Not Applicable               Current providers sharing in care for this patient include:  Patient Care Team:  Kamryn Lopez MD as PCP - General (Family Practice)  Kamryn Lopez MD as Assigned PCP    The following health maintenance items are reviewed in Epic and correct as of today:  Health Maintenance   Topic Date Due     COVID-19 Vaccine (1) Never done     Pneumococcal Vaccine: 65+ Years (1 - PCV) Never done     MEDICARE ANNUAL WELLNESS VISIT  04/21/2022     INFLUENZA VACCINE (1) Never done     MAMMO SCREENING  04/27/2023     FALL RISK ASSESSMENT  01/12/2024     COLORECTAL CANCER SCREENING  09/29/2025     ADVANCE CARE PLANNING  04/21/2026     LIPID  01/12/2028     DTAP/TDAP/TD IMMUNIZATION (2 - Td or Tdap) 02/07/2028     DEXA  06/17/2036     PHQ-2 (once per calendar year)  Completed     ZOSTER IMMUNIZATION  Completed     IPV IMMUNIZATION  Aged Out     MENINGITIS IMMUNIZATION  Aged Out     HEPATITIS C SCREENING  Discontinued         Any new diagnosis of family breast, ovarian, or bowel cancer? No    FHS-7: No flowsheet data found.      Pertinent mammograms are reviewed under the imaging tab.    Review of Systems  Constitutional, HEENT, cardiovascular, pulmonary, gi and gu systems are negative, except as otherwise noted.    OBJECTIVE:   /70 (BP Location: Right arm, Patient  "Position: Sitting, Cuff Size: Adult Regular)   Pulse 71   Temp (!) 96.2  F (35.7  C) (Tympanic)   Resp 18   Ht 1.6 m (5' 3\")   Wt 52.1 kg (114 lb 12.8 oz)   LMP  (LMP Unknown)   SpO2 99%   BMI 20.34 kg/m   Estimated body mass index is 20.34 kg/m  as calculated from the following:    Height as of this encounter: 1.6 m (5' 3\").    Weight as of this encounter: 52.1 kg (114 lb 12.8 oz).  Physical Exam  Constitutional:       Appearance: She is well-developed.   Eyes:      Conjunctiva/sclera: Conjunctivae normal.   Neck:      Thyroid: No thyromegaly.   Cardiovascular:      Rate and Rhythm: Normal rate and regular rhythm.      Heart sounds: Normal heart sounds. No murmur heard.  Pulmonary:      Effort: Pulmonary effort is normal. No respiratory distress.      Breath sounds: Normal breath sounds.   Abdominal:      Palpations: Abdomen is soft.   Lymphadenopathy:      Cervical: No cervical adenopathy.   Skin:     Findings: No rash.   Neurological:      Mental Status: She is alert and oriented to person, place, and time.         Diagnostic Test Results:  Labs reviewed in Epic    ASSESSMENT / PLAN:       ICD-10-CM    1. Osteoporosis, unspecified osteoporosis type, unspecified pathological fracture presence  M81.0       2. Dyslipidemia  E78.5 Lipid Panel     Basic Metabolic Panel     Basic Metabolic Panel     Lipid Panel      3. IFG (impaired fasting glucose)  R73.01 Hemoglobin A1c     Hemoglobin A1c      4. Health care maintenance  Z00.00       5. Skin lesion  L98.9 Adult Dermatology Referral            Patient has been advised of split billing requirements and indicates understanding: Yes      COUNSELING:  Reviewed preventive health counseling, as reflected in patient instructions        She reports that she has never smoked. She has never used smokeless tobacco.      Appropriate preventive services were discussed with this patient, including applicable screening as appropriate for cardiovascular disease, diabetes, " osteopenia/osteoporosis, and glaucoma.  As appropriate for age/gender, discussed screening for colorectal cancer, prostate cancer, breast cancer, and cervical cancer. Checklist reviewing preventive services available has been given to the patient.    Reviewed patients plan of care and provided an AVS. The Basic Care Plan (routine screening as documented in Health Maintenance) for Wanda meets the Care Plan requirement. This Care Plan has been established and reviewed with the Patient.      Kamryn Lopez MD  [facility]    Identified Health Risks:Review current opioid prescriptions    For a patient with a current opioid prescription:    Reviewed potential Opioid Use Disorder (OUD) risk factors: Yes n/a    Evaluate their pain severity and current treatment plan:     Provide information on non-opioid treatment options:    Refer to a specialist, as appropriate:    Get more information on pain management in the HHS Pain Management Best Practices Inter-agency Task Force Report    Screen for potential Substance Use Disorders (SUDs)    Reviewed the patient s potential risk factors for SUDs: Yes n/a    Refer to treatment or specialist, as appropriate:     A screening tool isn t required but you may use one:  Find more information in the National [place] on Drug Abuse Screening and Assessment Tools Chart    Answers for HPI/ROS submitted by the patient on 1/12/2023  PHQ9 TOTAL SCORE: 0

## 2023-01-22 ENCOUNTER — MYC MEDICAL ADVICE (OUTPATIENT)
Dept: FAMILY MEDICINE | Facility: OTHER | Age: 68
End: 2023-01-22
Payer: COMMERCIAL

## 2023-01-22 DIAGNOSIS — M81.0 OSTEOPOROSIS, UNSPECIFIED OSTEOPOROSIS TYPE, UNSPECIFIED PATHOLOGICAL FRACTURE PRESENCE: Primary | ICD-10-CM

## 2023-01-31 ENCOUNTER — HOSPITAL ENCOUNTER (OUTPATIENT)
Dept: MAMMOGRAPHY | Facility: OTHER | Age: 68
Discharge: HOME | End: 2023-01-31
Attending: FAMILY MEDICINE | Admitting: FAMILY MEDICINE
Payer: MEDICARE

## 2023-01-31 DIAGNOSIS — Z12.31 VISIT FOR SCREENING MAMMOGRAM: ICD-10-CM

## 2023-01-31 PROCEDURE — 77067 SCR MAMMO BI INCL CAD: CPT

## 2023-04-10 ENCOUNTER — MYC MEDICAL ADVICE (OUTPATIENT)
Dept: FAMILY MEDICINE | Facility: OTHER | Age: 68
End: 2023-04-10
Payer: COMMERCIAL

## 2023-04-10 DIAGNOSIS — Z12.83 SCREENING FOR MALIGNANT NEOPLASM OF SKIN: Primary | ICD-10-CM

## 2023-07-13 ENCOUNTER — HOSPITAL ENCOUNTER (OUTPATIENT)
Dept: BONE DENSITY | Facility: OTHER | Age: 68
Discharge: HOME | End: 2023-07-13
Attending: FAMILY MEDICINE | Admitting: FAMILY MEDICINE
Payer: MEDICARE

## 2023-07-13 DIAGNOSIS — M81.0 OSTEOPOROSIS, UNSPECIFIED OSTEOPOROSIS TYPE, UNSPECIFIED PATHOLOGICAL FRACTURE PRESENCE: ICD-10-CM

## 2023-07-13 PROCEDURE — 77080 DXA BONE DENSITY AXIAL: CPT

## 2023-07-14 ENCOUNTER — MYC MEDICAL ADVICE (OUTPATIENT)
Dept: FAMILY MEDICINE | Facility: OTHER | Age: 68
End: 2023-07-14
Payer: COMMERCIAL

## 2024-02-08 ENCOUNTER — OFFICE VISIT (OUTPATIENT)
Dept: FAMILY MEDICINE | Facility: OTHER | Age: 69
End: 2024-02-08
Attending: NURSE PRACTITIONER
Payer: MEDICARE

## 2024-02-08 ENCOUNTER — HOSPITAL ENCOUNTER (OUTPATIENT)
Dept: GENERAL RADIOLOGY | Facility: OTHER | Age: 69
Discharge: HOME | End: 2024-02-08
Attending: NURSE PRACTITIONER
Payer: MEDICARE

## 2024-02-08 VITALS
TEMPERATURE: 96.9 F | HEIGHT: 64 IN | BODY MASS INDEX: 20.14 KG/M2 | HEART RATE: 67 BPM | SYSTOLIC BLOOD PRESSURE: 119 MMHG | OXYGEN SATURATION: 98 % | WEIGHT: 118 LBS | DIASTOLIC BLOOD PRESSURE: 59 MMHG | RESPIRATION RATE: 18 BRPM

## 2024-02-08 DIAGNOSIS — M25.572 ACUTE LEFT ANKLE PAIN: ICD-10-CM

## 2024-02-08 DIAGNOSIS — M25.572 ACUTE LEFT ANKLE PAIN: Primary | ICD-10-CM

## 2024-02-08 DIAGNOSIS — M81.0 OSTEOPOROSIS, UNSPECIFIED OSTEOPOROSIS TYPE, UNSPECIFIED PATHOLOGICAL FRACTURE PRESENCE: ICD-10-CM

## 2024-02-08 DIAGNOSIS — S93.402A SPRAIN OF LEFT ANKLE, UNSPECIFIED LIGAMENT, INITIAL ENCOUNTER: ICD-10-CM

## 2024-02-08 PROCEDURE — G0463 HOSPITAL OUTPT CLINIC VISIT: HCPCS | Mod: 25

## 2024-02-08 PROCEDURE — G0463 HOSPITAL OUTPT CLINIC VISIT: HCPCS

## 2024-02-08 PROCEDURE — 99213 OFFICE O/P EST LOW 20 MIN: CPT | Performed by: NURSE PRACTITIONER

## 2024-02-08 PROCEDURE — 73610 X-RAY EXAM OF ANKLE: CPT | Mod: RT

## 2024-02-08 ASSESSMENT — PAIN SCALES - GENERAL: PAINLEVEL: MILD PAIN (3)

## 2024-02-08 NOTE — NURSING NOTE
"Chief Complaint   Patient presents with    Musculoskeletal Problem     Right ankle pain         Initial /59 (BP Location: Right arm, Patient Position: Sitting, Cuff Size: Adult Regular)   Pulse 67   Temp 96.9  F (36.1  C) (Temporal)   Resp 18   Ht 1.613 m (5' 3.5\")   Wt 53.5 kg (118 lb)   LMP  (LMP Unknown)   SpO2 98%   BMI 20.57 kg/m   Estimated body mass index is 20.57 kg/m  as calculated from the following:    Height as of this encounter: 1.613 m (5' 3.5\").    Weight as of this encounter: 53.5 kg (118 lb).       Lise Argueta     "

## 2024-02-08 NOTE — PROGRESS NOTES
Assessment & Plan   Problem List Items Addressed This Visit          Musculoskeletal and Integumentary    Osteoporosis     Other Visit Diagnoses       Acute left ankle pain    -  Primary    pain to medial ankle    Relevant Orders    XR Ankle Right G/E 3 Views (Completed)    Sprain of left ankle, unspecified ligament, initial encounter               2 and half weeks of ankle pain after increasing activity on the treadmill.  She does have osteoporosis.  X-ray obtained, this was normal.  Discussed ankle sprain, symptomatic management.  Follow-up as needed.    Review of the result(s) of each unique test - xray  Ordering of each unique test           No follow-ups on file.      Marcella Muñoz is a 68 year old, presenting for the following health issues:  Musculoskeletal Problem (Right ankle pain)    History of Present Illness       Reason for visit:  Ankel injury  Symptom onset:  1-2 weeks ago  Symptoms include:  Swelling   aching  Symptom intensity:  Moderate  Symptom progression:  Staying the same  Had these symptoms before:  No  What makes it worse:  No  What makes it better:  Heat    She eats 2-3 servings of fruits and vegetables daily.She consumes 0 sweetened beverage(s) daily.She exercises with enough effort to increase her heart rate 30 to 60 minutes per day.  She exercises with enough effort to increase her heart rate 4 days per week.   She is taking medications regularly.     She comes in the clinic today for evaluation of left ankle pain.  She reports her left ankle started hurting approximately 2 and half weeks ago.  She was walking on the treadmill and began to run, was running more on the ball of her foot and started having pain to her left ankle primarily on the lateral edge.  She has no difficulties with walking.  Does have increased pain when she goes downstairs.  She has reduced her activities to mild walking on the treadmill, no new shoes.  She has had swelling.  She is not taking anything  "over-the-counter for symptomatic management.  Reports a diagnosis of osteoporosis and wants to make sure that there were no fractures.        Review of Systems  See above      Objective    /59 (BP Location: Right arm, Patient Position: Sitting, Cuff Size: Adult Regular)   Pulse 67   Temp 96.9  F (36.1  C) (Temporal)   Resp 18   Ht 1.613 m (5' 3.5\")   Wt 53.5 kg (118 lb)   LMP  (LMP Unknown)   SpO2 98%   BMI 20.57 kg/m    Body mass index is 20.57 kg/m .  Physical Exam   GENERAL: alert and no distress  MS: no gross musculoskeletal defects noted, mild swelling to left lateral ankle, tender with palpation to posterior aspect of lateral ankle  SKIN: no suspicious lesions or rashes  PSYCH: mentation appears normal, affect normal/bright    Results for orders placed or performed during the hospital encounter of 02/08/24   XR Ankle Right G/E 3 Views     Status: None    Narrative    PROCEDURE:  XR ANKLE RIGHT G/E 3 VIEWS    HISTORY: Acute left ankle pain    COMPARISON:  None.    TECHNIQUE:  3 views of the right ankle were obtained.    FINDINGS:  No fracture or dislocation is identified. The joint spaces  are preserved.        Impression    IMPRESSION: Normal right ankle      ZHANE MENG MD         SYSTEM ID:  [id number]             Signed Electronically by: MARIANELA Bernard CNP    "

## 2024-03-01 ENCOUNTER — TELEPHONE (OUTPATIENT)
Dept: FAMILY MEDICINE | Facility: OTHER | Age: 69
End: 2024-03-01
Payer: COMMERCIAL

## 2024-03-01 NOTE — TELEPHONE ENCOUNTER
Called Patient and assured her to come appointment on Monday as we will be here.   Lign Cain LPN (Ext [phone] ) ..........3/1/2024 4:34 PM

## 2024-03-01 NOTE — TELEPHONE ENCOUNTER
States she received a call to reschedule appt Monday. Unsure who called or why. Pt wanted a note sent to check if it needs to be rescheduled, please call

## 2024-03-04 ENCOUNTER — MYC MEDICAL ADVICE (OUTPATIENT)
Dept: FAMILY MEDICINE | Facility: OTHER | Age: 69
End: 2024-03-04

## 2024-03-04 ENCOUNTER — OFFICE VISIT (OUTPATIENT)
Dept: FAMILY MEDICINE | Facility: OTHER | Age: 69
End: 2024-03-04
Attending: FAMILY MEDICINE
Payer: COMMERCIAL

## 2024-03-04 VITALS
DIASTOLIC BLOOD PRESSURE: 76 MMHG | HEIGHT: 64 IN | HEART RATE: 55 BPM | OXYGEN SATURATION: 97 % | RESPIRATION RATE: 16 BRPM | TEMPERATURE: 97.7 F | BODY MASS INDEX: 19.97 KG/M2 | WEIGHT: 117 LBS | SYSTOLIC BLOOD PRESSURE: 132 MMHG

## 2024-03-04 DIAGNOSIS — Z00.00 ENCOUNTER FOR MEDICARE ANNUAL WELLNESS EXAM: Primary | ICD-10-CM

## 2024-03-04 DIAGNOSIS — L90.0 LICHEN SCLEROSUS ET ATROPHICUS: ICD-10-CM

## 2024-03-04 DIAGNOSIS — M81.0 OSTEOPOROSIS, UNSPECIFIED OSTEOPOROSIS TYPE, UNSPECIFIED PATHOLOGICAL FRACTURE PRESENCE: ICD-10-CM

## 2024-03-04 LAB
ANION GAP SERPL CALCULATED.3IONS-SCNC: 4 MMOL/L (ref 7–15)
BUN SERPL-MCNC: 8.4 MG/DL (ref 8–23)
CALCIUM SERPL-MCNC: 10.4 MG/DL (ref 8.8–10.2)
CHLORIDE SERPL-SCNC: 104 MMOL/L (ref 98–107)
CHOLEST SERPL-MCNC: 217 MG/DL
CREAT SERPL-MCNC: 0.65 MG/DL (ref 0.51–0.95)
DEPRECATED HCO3 PLAS-SCNC: 29 MMOL/L (ref 22–29)
EGFRCR SERPLBLD CKD-EPI 2021: >90 ML/MIN/1.73M2
ERYTHROCYTE [DISTWIDTH] IN BLOOD BY AUTOMATED COUNT: 12.2 % (ref 10–15)
FASTING STATUS PATIENT QL REPORTED: ABNORMAL
GLUCOSE SERPL-MCNC: 92 MG/DL (ref 70–99)
HBA1C MFR BLD: 5.7 % (ref 4–6.2)
HCT VFR BLD AUTO: 38.7 % (ref 35–47)
HDLC SERPL-MCNC: 74 MG/DL
HGB BLD-MCNC: 12.8 G/DL (ref 11.7–15.7)
LDLC SERPL CALC-MCNC: 134 MG/DL
MCH RBC QN AUTO: 29.4 PG (ref 26.5–33)
MCHC RBC AUTO-ENTMCNC: 33.1 G/DL (ref 31.5–36.5)
MCV RBC AUTO: 89 FL (ref 78–100)
NONHDLC SERPL-MCNC: 143 MG/DL
PLATELET # BLD AUTO: 273 10E3/UL (ref 150–450)
POTASSIUM SERPL-SCNC: 4.6 MMOL/L (ref 3.4–5.3)
RBC # BLD AUTO: 4.35 10E6/UL (ref 3.8–5.2)
SODIUM SERPL-SCNC: 137 MMOL/L (ref 135–145)
TRIGL SERPL-MCNC: 47 MG/DL
WBC # BLD AUTO: 6.4 10E3/UL (ref 4–11)

## 2024-03-04 PROCEDURE — G0439 PPPS, SUBSEQ VISIT: HCPCS | Performed by: FAMILY MEDICINE

## 2024-03-04 PROCEDURE — 80061 LIPID PANEL: CPT | Mod: ZL | Performed by: FAMILY MEDICINE

## 2024-03-04 PROCEDURE — 85048 AUTOMATED LEUKOCYTE COUNT: CPT | Mod: ZL | Performed by: FAMILY MEDICINE

## 2024-03-04 PROCEDURE — 80048 BASIC METABOLIC PNL TOTAL CA: CPT | Mod: ZL | Performed by: FAMILY MEDICINE

## 2024-03-04 PROCEDURE — 83036 HEMOGLOBIN GLYCOSYLATED A1C: CPT | Mod: ZL,GZ | Performed by: FAMILY MEDICINE

## 2024-03-04 PROCEDURE — 36415 COLL VENOUS BLD VENIPUNCTURE: CPT | Mod: ZL | Performed by: FAMILY MEDICINE

## 2024-03-04 RX ORDER — CLOBETASOL PROPIONATE 0.5 MG/G
CREAM TOPICAL 2 TIMES DAILY
Qty: 45 G | Refills: 2 | Status: SHIPPED | OUTPATIENT
Start: 2024-03-04

## 2024-03-04 SDOH — HEALTH STABILITY: PHYSICAL HEALTH: ON AVERAGE, HOW MANY DAYS PER WEEK DO YOU ENGAGE IN MODERATE TO STRENUOUS EXERCISE (LIKE A BRISK WALK)?: 5 DAYS

## 2024-03-04 ASSESSMENT — PAIN SCALES - GENERAL: PAINLEVEL: NO PAIN (0)

## 2024-03-04 ASSESSMENT — SOCIAL DETERMINANTS OF HEALTH (SDOH): HOW OFTEN DO YOU GET TOGETHER WITH FRIENDS OR RELATIVES?: THREE TIMES A WEEK

## 2024-03-04 NOTE — PROGRESS NOTES
Preventive Care Visit  [facility] AND [facility]  Kamryn Lopez MD, Family Medicine  Mar 4, 2024    Assessment & Plan       ICD-10-CM    1. Encounter for Medicare annual wellness exam  Z00.00 Lipid Panel     Hemoglobin A1c     Basic Metabolic Panel     CBC W PLT No Diff     Lipid Panel     Hemoglobin A1c     Basic Metabolic Panel     CBC W PLT No Diff      2. Lichen sclerosus et atrophicus  L90.0 clobetasol propionate (TEMOVATE) 0.05 % external cream      3. Osteoporosis, unspecified osteoporosis type, unspecified pathological fracture presence  M81.0         Treatment for osteoporosis has been recommended's on several occasions in the past.  Patient is still reluctant to proceed.  Handout provided.  Discussed that typically would start Reclast with once yearly infusions.  She will consider and let me know if wanting to get this scheduled.  In the meantime, she will continue with calcium and vitamin D supplementation.    Medications reviewed and refilled.  Fasting labs today.  Patient will be due for colonoscopy next year.  We are done with Pap smears.  She would like to continue with every other year mammogram.    Reviewed recommended vaccines, patient declines.      Counseling  Appropriate preventive services were discussed with this patient, including applicable screening as appropriate for fall prevention, nutrition, physical activity, Tobacco-use cessation, weight loss and cognition.  Checklist reviewing preventive services available has been given to the patient.  Reviewed patient's diet, addressing concerns and/or questions.   She is at risk for psychosocial distress and has been provided with information to reduce risk.   Patient reported safety concerns were addressed today.      No follow-ups on file.    Marcella Muñoz is a 68 year old, presenting for the following:  Wellness Visit (Yearly wellness visit. )        3/4/2024     8:16 AM   Additional Questions   Roomed by Ling   Accompanied by  self         Health Care Directive  Patient does not have a Health Care Directive or Living Will: Patient states has Advance Directive and will bring in a copy to clinic.    HPI          3/4/2024   General Health   How would you rate your overall physical health? Good   Feel stress (tense, anxious, or unable to sleep) Only a little   (!) STRESS CONCERN      3/4/2024   Nutrition   Diet: Regular (no restrictions)         3/4/2024   Exercise   Days per week of moderate/strenous exercise 5 days         3/4/2024   Social Factors   Frequency of gathering with friends or relatives Three times a week   Worry food won't last until get money to buy more No   Food not last or not have enough money for food? No   Do you have housing?  Yes   Are you worried about losing your housing? No   Lack of transportation? No   Unable to get utilities (heat,electricity)? No         3/4/2024   Fall Risk   Fallen 2 or more times in the past year? No    No   Trouble with walking or balance? No    No          3/4/2024   Activities of Daily Living- Home Safety   Needs help with the following daily activites None of the above   Safety concerns in the home No grab bars in the bathroom         3/4/2024   Dental   Dentist two times every year? Yes         3/4/2024   Hearing Screening   Hearing concerns? None of the above         3/4/2024   Driving Risk Screening   Patient/family members have concerns about driving No         3/4/2024   General Alertness/Fatigue Screening   Have you been more tired than usual lately? No         3/4/2024   Urinary Incontinence Screening   Bothered by leaking urine in past 6 months No            Today's PHQ-2 Score:       3/4/2024     8:12 AM   PHQ-2 ( 1999 [employer])   Q1: Little interest or pleasure in doing things 0   Q2: Feeling down, depressed or hopeless 0   PHQ-2 Score 0   Q1: Little interest or pleasure in doing things Not at all   Q2: Feeling down, depressed or hopeless Not at all   PHQ-2 Score 0            3/4/2024   Substance Use   Alcohol more than 3/day or more than 7/wk Not Applicable   Do you have a current opioid prescription? No   How severe/bad is pain from 1 to 10? 0/10 (No Pain)   Do you use any other substances recreationally? (!) PRESCRIPTION DRUGS     Social History     Tobacco Use    Smoking status: Never    Smokeless tobacco: Never   Vaping Use    Vaping Use: Never used   Substance Use Topics    Alcohol use: No    Drug use: Never     Comment: Drug use: No             3/4/2024   Breast Cancer Screening   Family history of breast, colon, or ovarian cancer? No / Unknown         1/31/2023   LAST FHS-7 RESULTS   1st degree relative breast or ovarian cancer No   Any relative bilateral breast cancer No   Any male have breast cancer No   Any ONE woman have BOTH breast AND ovarian cancer No   Any woman with breast cancer before 50yrs No   2 or more relatives with breast AND/OR ovarian cancer No   2 or more relatives with breast AND/OR bowel cancer No            ASCVD Risk   The 10-year ASCVD risk score (Benjamín DELGADO, et al., 2019) is: 6.9%    Values used to calculate the score:      Age: 68 years      Sex: Female      Is Non- : No      Diabetic: No      Tobacco smoker: No      Systolic Blood Pressure: 132 mmHg      Is BP treated: No      HDL Cholesterol: 74 mg/dL      Total Cholesterol: 217 mg/dL            Reviewed and updated as needed this visit by Provider                    Current providers sharing in care for this patient include:  Patient Care Team:  Kamryn Lopez MD as PCP - General (Family Practice)  Kamryn Lopez MD as Assigned PCP    The following health maintenance items are reviewed in Epic and correct as of today:  Health Maintenance   Topic Date Due    RSV VACCINE (Pregnancy & 60+) (1 - 1-dose 60+ series) Never done    Pneumococcal Vaccine: 65+ Years (1 of 1 - PCV) Never done    INFLUENZA VACCINE (1) Never done    COVID-19 Vaccine (1 - 2023-24 season) Never done     "MAMMO SCREENING  01/31/2025    MEDICARE ANNUAL WELLNESS VISIT  03/04/2025    FALL RISK ASSESSMENT  03/04/2025    COLORECTAL CANCER SCREENING  09/29/2025    GLUCOSE  03/04/2027    ADVANCE CARE PLANNING  01/13/2028    DTAP/TDAP/TD IMMUNIZATION (2 - Td or Tdap) 02/07/2028    LIPID  03/04/2029    DEXA  07/13/2038    PHQ-2 (once per calendar year)  Completed    ZOSTER IMMUNIZATION  Completed    IPV IMMUNIZATION  Aged Out    HPV IMMUNIZATION  Aged Out    MENINGITIS IMMUNIZATION  Aged Out    RSV MONOCLONAL ANTIBODY  Aged Out    HEPATITIS C SCREENING  Discontinued          Objective    Exam  /76 (BP Location: Right arm, Patient Position: Sitting, Cuff Size: Adult Regular)   Pulse 55   Temp 97.7  F (36.5  C) (Tympanic)   Resp 16   Ht 1.613 m (5' 3.5\")   Wt 53.1 kg (117 lb)   LMP  (LMP Unknown)   SpO2 97%   BMI 20.40 kg/m     Estimated body mass index is 20.4 kg/m  as calculated from the following:    Height as of this encounter: 1.613 m (5' 3.5\").    Weight as of this encounter: 53.1 kg (117 lb).    Physical Exam  Constitutional:       Appearance: She is well-developed.   Eyes:      Conjunctiva/sclera: Conjunctivae normal.   Neck:      Thyroid: No thyromegaly.   Cardiovascular:      Rate and Rhythm: Normal rate and regular rhythm.      Heart sounds: Normal heart sounds. No murmur heard.  Pulmonary:      Effort: Pulmonary effort is normal. No respiratory distress.      Breath sounds: Normal breath sounds.   Abdominal:      Palpations: Abdomen is soft.   Lymphadenopathy:      Cervical: No cervical adenopathy.   Skin:     Findings: No rash.   Neurological:      Mental Status: She is alert and oriented to person, place, and time.             3/4/2024   Mini Cog   Clock Draw Score 2 Normal   3 Item Recall 3 objects recalled   Mini Cog Total Score 5            Signed Electronically by: Kamryn Lopez MD    "

## 2024-03-04 NOTE — PATIENT INSTRUCTIONS
Famotidine at night when having symptoms.     If symptoms increase in frequency, take omeprazole daily x 2 weeks and assess response. This can be a helpful diagnostic tool.     Topical diclofenac (voltaren) for hand pain/arthritis.       Preventive Care Advice   This is general advice given by our system to help you stay healthy. However, your care team may have specific advice just for you. Please talk to your care team about your preventive care needs.  Nutrition  Eat 5 or more servings of fruits and vegetables each day.  Try wheat bread, brown rice and whole grain pasta (instead of white bread, rice, and pasta).  Get enough calcium and vitamin D. Check the label on foods and aim for 100% of the RDA (recommended daily allowance).  Lifestyle  Exercise at least 150 minutes each week   (30 minutes a day, 5 days a week).  Do muscle strengthening activities 2 days a week. These help control your weight and prevent disease.  No smoking.  Wear sunscreen to prevent skin cancer.  Have a dental exam and cleaning every 6 months.  Yearly exams  See your health care team every year to talk about:  Any changes in your health.  Any medicines your care team has prescribed.  Preventive care, family planning, and ways to prevent chronic diseases.  Shots (vaccines)   HPV shots (up to age 26), if you've never had them before.  Hepatitis B shots (up to age 59), if you've never had them before.  COVID-19 shot: Get this shot when it's due.  Flu shot: Get a flu shot every year.  Tetanus shot: Get a tetanus shot every 10 years.  Pneumococcal, hepatitis A, and RSV shots: Ask your care team if you need these based on your risk.  Shingles shot (for age 50 and up).  General health tests  Diabetes screening:  Starting at age 35, Get screened for diabetes at least every 3 years.  If you are younger than age 35, ask your care team if you should be screened for diabetes.  Cholesterol test: At age 39, start having a cholesterol test every 5 years,  or more often if advised.  Bone density scan (DEXA): At age 50, ask your care team if you should have this scan for osteoporosis (brittle bones).  Hepatitis C: Get tested at least once in your life.  STIs (sexually transmitted infections)  Before age 24: Ask your care team if you should be screened for STIs.  After age 24: Get screened for STIs if you're at risk. You are at risk for STIs (including HIV) if:  You are sexually active with more than one person.  You don't use condoms every time.  You or a partner was diagnosed with a sexually transmitted infection.  If you are at risk for HIV, ask about PrEP medicine to prevent HIV.  Get tested for HIV at least once in your life, whether you are at risk for HIV or not.  Cancer screening tests  Cervical cancer screening: If you have a cervix, begin getting regular cervical cancer screening tests at age 21. Most people who have regular screenings with normal results can stop after age 65. Talk about this with your provider.  Breast cancer scan (mammogram): If you've ever had breasts, begin having regular mammograms starting at age 40. This is a scan to check for breast cancer.  Colon cancer screening: It is important to start screening for colon cancer at age 45.  Have a colonoscopy test every 10 years (or more often if you're at risk) Or, ask your provider about stool tests like a FIT test every year or Cologuard test every 3 years.  To learn more about your testing options, visit: https://[email or web address]/909915.pdf.  For help making a decision, visit: https://bit.ly/gt06839.  Prostate cancer screening test: If you have a prostate and are age 55 to 69, ask your provider if you would benefit from a yearly prostate cancer screening test.  Lung cancer screening: If you are a current or former smoker age 50 to 80, ask your care team if ongoing lung cancer screenings are right for you.  For informational purposes only. Not to replace the advice of your health care provider.  Copyright   2023 [facility]. All rights reserved. Clinically reviewed by the [facility]. [employer] [id number] - REV 01/24.    Learning About Activities of Daily Living  What are activities of daily living?     Activities of daily living (ADLs) are the basic self-care tasks you do every day. As you age, and if you have health problems, you may find that it's harder to do these things for yourself. That's when you may need some help.  Your doctor uses ADLs to measure how much help you need. Knowing what you can and can't do for yourself is an important first step to getting help. And when you have the help you need, you can stay as independent as possible.  Your doctor will want to know if you are able to do tasks such as:  Take a bath or shower without help.  Go to the bathroom by yourself.  Dress and undress without help.  Shave, comb your hair, and brush teeth on your own.  Get in and out of bed or a chair without help.  Feed yourself without help.  If you are having trouble doing basic self-care tasks, talk with your doctor. You may want to bring a caregiver or family member who can help the doctor understand your needs and abilities.  How will a doctor assess your ADLs?  Asking about ADLs is part of a routine health checkup your doctor will likely do as you age. Your health check might be done in a doctor's office, in your home, or at a hospital. The goal is to find out if you are having any problems that could make your health problems worse or that make it unsafe for you to be on your own.  To measure your ADLs, your doctor will ask how hard it is for you to do routine tasks. He or she may also want to know if you have changed the way you do a task because of a health problem. He or she may watch how you:  Walk back and forth.  Keep your balance while you stand or walk.  Move from sitting to standing or from a bed to a chair.  Button or unbutton a shirt or sweater.  Remove  and put on your shoes.  It's normal to feel a little worried or anxious if you find you can't do all the things you used to be able to do. Talking with your doctor about ADLs isn't a test that you either pass or fail. It's just a way to get more information about your health and safety.  Follow-up care is a key part of your treatment and safety. Be sure to make and go to all appointments, and call your doctor if you are having problems. It's also a good idea to know your test results and keep a list of the medicines you take.  Current as of: February 26, 2023               Content Version: 13.8    4931-3010 [employer].   Care instructions adapted under license by your healthcare professional. If you have questions about a medical condition or this instruction, always ask your healthcare professional. [employer] disclaims any warranty or liability for your use of this information.      Learning About Stress  What is stress?     Stress is your body's response to a hard situation. Your body can have a physical, emotional, or mental response. Stress is a fact of life for most people, and it affects everyone differently. What causes stress for you may not be stressful for someone else.  A lot of things can cause stress. You may feel stress when you go on a job interview, take a test, or run a race. This kind of short-term stress is normal and even useful. It can help you if you need to work hard or react quickly. For example, stress can help you finish an important job on time.  Long-term stress is caused by ongoing stressful situations or events. Examples of long-term stress include long-term health problems, ongoing problems at work, or conflicts in your family. Long-term stress can harm your health.  How does stress affect your health?  When you are stressed, your body responds as though you are in danger. It makes hormones that speed up your heart, make you breathe faster, and give you a  burst of energy. This is called the fight-or-flight stress response. If the stress is over quickly, your body goes back to normal and no harm is done.  But if stress happens too often or lasts too long, it can have bad effects. Long-term stress can make you more likely to get sick, and it can make symptoms of some diseases worse. If you tense up when you are stressed, you may develop neck, shoulder, or low back pain. Stress is linked to high blood pressure and heart disease.  Stress also harms your emotional health. It can make you grant, tense, or depressed. Your relationships may suffer, and you may not do well at work or school.  What can you do to manage stress?  You can try these things to help manage stress:   Do something active. Exercise or activity can help reduce stress. Walking is a great way to get started. Even everyday activities such as housecleaning or yard work can help.  Try yoga or rosie chi. These techniques combine exercise and meditation. You may need some training at first to learn them.  Do something you enjoy. For example, listen to music or go to a movie. Practice your hobby or do volunteer work.  Meditate. This can help you relax, because you are not worrying about what happened before or what may happen in the future.  Do guided imagery. Imagine yourself in any setting that helps you feel calm. You can use online videos, books, or a teacher to guide you.  Do breathing exercises. For example:  From a standing position, bend forward from the waist with your knees slightly bent. Let your arms dangle close to the floor.  Breathe in slowly and deeply as you return to a standing position. Roll up slowly and lift your head last.  Hold your breath for just a few seconds in the standing position.  Breathe out slowly and bend forward from the waist.  Let your feelings out. Talk, laugh, cry, and express anger when you need to. Talking with supportive friends or family, a counselor, or a joe leader  "about your feelings is a healthy way to relieve stress. Avoid discussing your feelings with people who make you feel worse.  Write. It may help to write about things that are bothering you. This helps you find out how much stress you feel and what is causing it. When you know this, you can find better ways to cope.  What can you do to prevent stress?  You might try some of these things to help prevent stress:  Manage your time. This helps you find time to do the things you want and need to do.  Get enough sleep. Your body recovers from the stresses of the day while you are sleeping.  Get support. Your family, friends, and community can make a difference in how you experience stress.  Limit your news feed. Avoid or limit time on social media or news that may make you feel stressed.  Do something active. Exercise or activity can help reduce stress. Walking is a great way to get started.  Where can you learn more?  Go to https://www.[employer].net/patiented  Enter N032 in the search box to learn more about \"Learning About Stress.\"  Current as of: February 26, 2023               Content Version: 13.8    5494-0567 [employer].   Care instructions adapted under license by your healthcare professional. If you have questions about a medical condition or this instruction, always ask your healthcare professional. [employer] disclaims any warranty or liability for your use of this information.      Substance Use Disorder: Care Instructions  Overview     You can improve your life and health by stopping your use of alcohol or drugs. When you don't drink or use drugs, you may feel and sleep better. You may get along better with your family, friends, and coworkers. There are medicines and programs that can help with substance use disorder.  How can you care for yourself at home?  Here are some ways to help you stay sober and prevent relapse.  If you have been given medicine to help keep you sober or " reduce your cravings, be sure to take it exactly as prescribed.  Talk to your doctor about programs that can help you stop using drugs or drinking alcohol.  Do not keep alcohol or drugs in your home.  Plan ahead. Think about what you'll say if other people ask you to drink or use drugs. Try not to spend time with people who drink or use drugs.  Use the time and money spent on drinking or drugs to do something that's important to you.  Preventing a relapse  Have a plan to deal with relapse. Learn to recognize changes in your thinking that lead you to drink or use drugs. Get help before you start to drink or use drugs again.  Try to stay away from situations, friends, or places that may lead you to drink or use drugs.  If you feel the need to drink alcohol or use drugs again, seek help right away. Call a trusted friend or family member. Some people get support from organizations such as Narcotics Anonymous or [employer] or from treatment facilities.  If you relapse, get help as soon as you can. Some people make a plan with another person that outlines what they want that person to do for them if they relapse. The plan usually includes how to handle the relapse and who to notify in case of relapse.  Don't give up. Remember that a relapse doesn't mean that you have failed. Use the experience to learn the triggers that lead you to drink or use drugs. Then quit again. Recovery is a lifelong process. Many people have several relapses before they are able to quit for good.  Follow-up care is a key part of your treatment and safety. Be sure to make and go to all appointments, and call your doctor if you are having problems. It's also a good idea to know your test results and keep a list of the medicines you take.  When should you call for help?   Call 911  anytime you think you may need emergency care. For example, call if you or someone else:    Has overdosed or has withdrawal signs. Be sure to tell the emergency  "workers that you are or someone else is using or trying to quit using drugs. Overdose or withdrawal signs may include:  Losing consciousness.  Seizure.  Seeing or hearing things that aren't there (hallucinations).     Is thinking or talking about suicide or harming others.   Where to get help 24 hours a day, 7 days a week   If you or someone you know talks about suicide, self-harm, a mental health crisis, a substance use crisis, or any other kind of emotional distress, get help right away. You can:    Call the Suicide and Crisis Lifeline at 288.     Call 7-014-541-TALK (1-223.409.3589).     Text HOME to 974219 to access the Crisis Text Line.   Consider saving these numbers in your phone.  Go to [employer] for more information or to chat online.  Call your doctor now or seek immediate medical care if:    You are having withdrawal symptoms. These may include nausea or vomiting, sweating, shakiness, and anxiety.   Watch closely for changes in your health, and be sure to contact your doctor if:    You have a relapse.     You need more help or support to stop.   Where can you learn more?  Go to https://www.[employer].net/patiented  Enter H573 in the search box to learn more about \"Substance Use Disorder: Care Instructions.\"  Current as of: March 21, 2023               Content Version: 13.8    3597-3477 [employer].   Care instructions adapted under license by your healthcare professional. If you have questions about a medical condition or this instruction, always ask your healthcare professional. [employer] disclaims any warranty or liability for your use of this information.      "

## 2024-03-04 NOTE — NURSING NOTE
"Chief Complaint   Patient presents with    Wellness Visit     Yearly wellness visit.        Initial /76 (BP Location: Right arm, Patient Position: Sitting, Cuff Size: Adult Regular)   Pulse 55   Temp 97.7  F (36.5  C) (Tympanic)   Resp 16   Ht 1.613 m (5' 3.5\")   Wt 53.1 kg (117 lb)   LMP  (LMP Unknown)   SpO2 97%   BMI 20.40 kg/m   Estimated body mass index is 20.4 kg/m  as calculated from the following:    Height as of this encounter: 1.613 m (5' 3.5\").    Weight as of this encounter: 53.1 kg (117 lb).  Medication Reconciliation: complete    Ling Cain LPN    Advance Care Directive reviewed    "

## 2024-03-05 NOTE — TELEPHONE ENCOUNTER
Updated patient medication list to reflect what she is currently taking (for her daily multivitamin).  Takes 1 daily.  Was listed previously as TID.      Nidia Griggs RN on 3/5/2024 at 11:43 AM

## 2025-05-17 ENCOUNTER — HEALTH MAINTENANCE LETTER (OUTPATIENT)
Age: 70
End: 2025-05-17

## 2025-06-15 SDOH — HEALTH STABILITY: PHYSICAL HEALTH: ON AVERAGE, HOW MANY MINUTES DO YOU ENGAGE IN EXERCISE AT THIS LEVEL?: 30 MIN

## 2025-06-15 SDOH — HEALTH STABILITY: PHYSICAL HEALTH: ON AVERAGE, HOW MANY DAYS PER WEEK DO YOU ENGAGE IN MODERATE TO STRENUOUS EXERCISE (LIKE A BRISK WALK)?: 3 DAYS

## 2025-06-15 ASSESSMENT — SOCIAL DETERMINANTS OF HEALTH (SDOH): HOW OFTEN DO YOU GET TOGETHER WITH FRIENDS OR RELATIVES?: THREE TIMES A WEEK

## 2025-06-16 ENCOUNTER — RESULTS FOLLOW-UP (OUTPATIENT)
Dept: FAMILY MEDICINE | Facility: OTHER | Age: 70
End: 2025-06-16

## 2025-06-16 ENCOUNTER — MYC MEDICAL ADVICE (OUTPATIENT)
Dept: FAMILY MEDICINE | Facility: OTHER | Age: 70
End: 2025-06-16

## 2025-06-16 ENCOUNTER — OFFICE VISIT (OUTPATIENT)
Dept: FAMILY MEDICINE | Facility: OTHER | Age: 70
End: 2025-06-16
Attending: FAMILY MEDICINE
Payer: MEDICARE

## 2025-06-16 VITALS
BODY MASS INDEX: 19.53 KG/M2 | OXYGEN SATURATION: 98 % | HEIGHT: 63 IN | SYSTOLIC BLOOD PRESSURE: 136 MMHG | HEART RATE: 61 BPM | WEIGHT: 110.2 LBS | TEMPERATURE: 97.5 F | DIASTOLIC BLOOD PRESSURE: 82 MMHG | RESPIRATION RATE: 16 BRPM

## 2025-06-16 DIAGNOSIS — E28.39 ESTROGEN DEFICIENCY: ICD-10-CM

## 2025-06-16 DIAGNOSIS — R73.01 IFG (IMPAIRED FASTING GLUCOSE): ICD-10-CM

## 2025-06-16 DIAGNOSIS — G57.02 PIRIFORMIS SYNDROME, LEFT: ICD-10-CM

## 2025-06-16 DIAGNOSIS — L90.0 LICHEN SCLEROSUS ET ATROPHICUS: ICD-10-CM

## 2025-06-16 DIAGNOSIS — Z12.31 VISIT FOR SCREENING MAMMOGRAM: Primary | ICD-10-CM

## 2025-06-16 DIAGNOSIS — Z12.11 COLON CANCER SCREENING: ICD-10-CM

## 2025-06-16 DIAGNOSIS — E78.5 DYSLIPIDEMIA: ICD-10-CM

## 2025-06-16 DIAGNOSIS — M81.0 OSTEOPOROSIS, UNSPECIFIED OSTEOPOROSIS TYPE, UNSPECIFIED PATHOLOGICAL FRACTURE PRESENCE: ICD-10-CM

## 2025-06-16 LAB
ALBUMIN SERPL BCG-MCNC: 4.3 G/DL (ref 3.5–5.2)
ALP SERPL-CCNC: 85 U/L (ref 40–150)
ALT SERPL W P-5'-P-CCNC: 29 U/L (ref 0–50)
ANION GAP SERPL CALCULATED.3IONS-SCNC: 9 MMOL/L (ref 7–15)
AST SERPL W P-5'-P-CCNC: 23 U/L (ref 0–45)
BILIRUB SERPL-MCNC: 0.4 MG/DL
BUN SERPL-MCNC: 7 MG/DL (ref 8–23)
CALCIUM SERPL-MCNC: 9.8 MG/DL (ref 8.8–10.4)
CHLORIDE SERPL-SCNC: 106 MMOL/L (ref 98–107)
CHOLEST SERPL-MCNC: 225 MG/DL
CREAT SERPL-MCNC: 0.68 MG/DL (ref 0.51–0.95)
EGFRCR SERPLBLD CKD-EPI 2021: >90 ML/MIN/1.73M2
EST. AVERAGE GLUCOSE BLD GHB EST-MCNC: 117 MG/DL
FASTING STATUS PATIENT QL REPORTED: YES
FASTING STATUS PATIENT QL REPORTED: YES
GLUCOSE SERPL-MCNC: 97 MG/DL (ref 70–99)
HBA1C MFR BLD: 5.7 %
HCO3 SERPL-SCNC: 25 MMOL/L (ref 22–29)
HDLC SERPL-MCNC: 83 MG/DL
LDLC SERPL CALC-MCNC: 131 MG/DL
NONHDLC SERPL-MCNC: 142 MG/DL
POTASSIUM SERPL-SCNC: 4.3 MMOL/L (ref 3.4–5.3)
PROT SERPL-MCNC: 7.1 G/DL (ref 6.4–8.3)
SODIUM SERPL-SCNC: 140 MMOL/L (ref 135–145)
TRIGL SERPL-MCNC: 55 MG/DL

## 2025-06-16 PROCEDURE — 84155 ASSAY OF PROTEIN SERUM: CPT | Mod: ZL | Performed by: FAMILY MEDICINE

## 2025-06-16 PROCEDURE — G0463 HOSPITAL OUTPT CLINIC VISIT: HCPCS | Performed by: FAMILY MEDICINE

## 2025-06-16 PROCEDURE — 36415 COLL VENOUS BLD VENIPUNCTURE: CPT | Mod: ZL | Performed by: FAMILY MEDICINE

## 2025-06-16 PROCEDURE — 83036 HEMOGLOBIN GLYCOSYLATED A1C: CPT | Mod: ZL | Performed by: FAMILY MEDICINE

## 2025-06-16 PROCEDURE — 80061 LIPID PANEL: CPT | Mod: ZL | Performed by: FAMILY MEDICINE

## 2025-06-16 RX ORDER — DORZOLAMIDE HYDROCHLORIDE AND TIMOLOL MALEATE 20; 5 MG/ML; MG/ML
1 SOLUTION/ DROPS OPHTHALMIC 2 TIMES DAILY
COMMUNITY
Start: 2025-04-21

## 2025-06-16 RX ORDER — LATANOPROST 50 UG/ML
1 SOLUTION/ DROPS OPHTHALMIC DAILY
COMMUNITY
Start: 2025-04-21

## 2025-06-16 RX ORDER — OMEGA-3/DHA/EPA/FISH OIL 60 MG-90MG
1 CAPSULE ORAL DAILY
COMMUNITY

## 2025-06-16 NOTE — TELEPHONE ENCOUNTER
"Wondering what the \"recipe\" was for the food you mentioned that could help with fiber at OV today.     Juvencio Luis RN on 6/16/2025 at 3:21 PM    "

## 2025-06-16 NOTE — NURSING NOTE
"Chief Complaint   Patient presents with    Physical     Yearly wellness physical.        Initial /82 (BP Location: Right arm, Patient Position: Sitting, Cuff Size: Adult Regular)   Pulse 61   Temp 97.5  F (36.4  C) (Tympanic)   Resp 16   Ht 1.607 m (5' 3.25\")   Wt 50 kg (110 lb 3.2 oz)   LMP  (LMP Unknown)   SpO2 98%   BMI 19.37 kg/m   Estimated body mass index is 19.37 kg/m  as calculated from the following:    Height as of this encounter: 1.607 m (5' 3.25\").    Weight as of this encounter: 50 kg (110 lb 3.2 oz).  Medication Reconciliation: complete    Ling Cain LPN    Advance Care Directive reviewed    "

## 2025-06-16 NOTE — PROGRESS NOTES
Preventive Care Visit  [facility]  Kamryn Lopez MD, Family Medicine  Jun 16, 2025      Assessment & Plan       ICD-10-CM    1. Visit for screening mammogram  Z12.31 MA Screen Bilateral w/Sebas      2. Osteoporosis, unspecified osteoporosis type, unspecified pathological fracture presence  M81.0 DX Bone Density      3. Lichen sclerosus et atrophicus  L90.0       4. Estrogen deficiency  E28.39 DX Bone Density      5. Dyslipidemia  E78.5 Lipid Panel     Comprehensive Metabolic Panel     CT Coronary Calcium Scan     Lipid Panel     Comprehensive Metabolic Panel      6. IFG (impaired fasting glucose)  R73.01 Hemoglobin A1c     Hemoglobin A1c      7. Colon cancer screening  Z12.11 Colonoscopy Screening  Referral      8. Piriformis syndrome, left  G57.02 Physical Therapy  Referral        Labs today.  Patient will return for a mammogram and bone density scan.  Reviewed previous lipids, ASCVD score.  Patient is interested in proceeding with a CT calcium score.  These orders are placed.  Patient is due for a colonoscopy, referral placed.  Left buttock pain consistent with piriformis syndrome.  Refer to PT.  Previous treatment of osteoporosis has been recommended, handouts provided.  Will continue to discuss.      Counseling  Appropriate preventive services were addressed with this patient via screening, questionnaire, or discussion as appropriate for fall prevention, nutrition, physical activity, Tobacco-use cessation, social engagement, weight loss and cognition.  Checklist reviewing preventive services available has been given to the patient.  Reviewed patient's diet, addressing concerns and/or questions.   She is at risk for lack of exercise and has been provided with information to increase physical activity for the benefit of her well-being.         Subjective   Wanda is a 70 year old, presenting for the following:  Physical (Yearly wellness physical. )        6/16/2025     8:22  AM   Additional Questions   Roomed by Ling   Accompanied by self          HPI         Advance Care Planning    Discussed advance care planning with patient; informed AVS has link to [facility] [employer].        6/15/2025   General Health   How would you rate your overall physical health? Excellent   Feel stress (tense, anxious, or unable to sleep) Only a little   (!) STRESS CONCERN      6/15/2025   Nutrition   Diet: Regular (no restrictions)         6/15/2025   Exercise   Days per week of moderate/strenous exercise 3 days   Average minutes spent exercising at this level 30 min         6/15/2025   Social Factors   Frequency of gathering with friends or relatives Three times a week   Worry food won't last until get money to buy more No   Food not last or not have enough money for food? No   Do you have housing? (Housing is defined as stable permanent housing and does not include staying outside in a car, in a tent, in an abandoned building, in an overnight shelter, or couch-surfing.) Yes   Are you worried about losing your housing? No   Lack of transportation? No   Unable to get utilities (heat,electricity)? No         6/15/2025   Fall Risk   Fallen 2 or more times in the past year? No   Trouble with walking or balance? No          6/15/2025   Activities of Daily Living- Home Safety   Needs help with the following daily activites None of the above   Safety concerns in the home None of the above         6/15/2025   Dental   Dentist two times every year? Yes         6/15/2025   Hearing Screening   Hearing concerns? None of the above         6/15/2025   Driving Risk Screening   Patient/family members have concerns about driving No         6/15/2025   General Alertness/Fatigue Screening   Have you been more tired than usual lately? No         6/15/2025   Urinary Incontinence Screening   Bothered by leaking urine in past 6 months No         Today's PHQ-2 Score:       6/15/2025     5:46 PM   PHQ-2 ( 1999 [employer])   Q1: Little  interest or pleasure in doing things 0   Q2: Feeling down, depressed or hopeless 0   PHQ-2 Score 0    Q1: Little interest or pleasure in doing things Not at all   Q2: Feeling down, depressed or hopeless Not at all   PHQ-2 Score 0       Patient-reported           6/15/2025   Substance Use   Alcohol more than 3/day or more than 7/wk Not Applicable   Do you have a current opioid prescription? No   How severe/bad is pain from 1 to 10? 0/10 (No Pain)   Do you use any other substances recreationally? No     Social History     Tobacco Use    Smoking status: Never    Smokeless tobacco: Never   Vaping Use    Vaping status: Never Used   Substance Use Topics    Alcohol use: No    Drug use: Never     Comment: Drug use: No           1/31/2023   LAST FHS-7 RESULTS   1st degree relative breast or ovarian cancer No   Any relative bilateral breast cancer No   Any male have breast cancer No   Any ONE woman have BOTH breast AND ovarian cancer No   Any woman with breast cancer before 50yrs No   2 or more relatives with breast AND/OR ovarian cancer No   2 or more relatives with breast AND/OR bowel cancer No            History of abnormal Pap smear: No - age 65 or older with adequate negative prior screening test results (3 consecutive negative cytology results, 2 consecutive negative cotesting results, or 2 consecutive negative HrHPV test results within 10 years, with the most recent test occurring within the recommended screening interval for the test used)       ASCVD Risk   The 10-year ASCVD risk score (Benjamín DELGADO, et al., 2019) is: 9%    Values used to calculate the score:      Age: 70 years      Sex: Female      Is Non- : No      Diabetic: No      Tobacco smoker: No      Systolic Blood Pressure: 136 mmHg      Is BP treated: No      HDL Cholesterol: 83 mg/dL      Total Cholesterol: 225 mg/dL            Reviewed and updated as needed this visit by Provider                      Current providers sharing  "in care for this patient include:  Patient Care Team:  Kamryn Lopez MD as PCP - General (Family Practice)  Kamryn Lopez MD as Assigned PCP    The following health maintenance items are reviewed in Epic and correct as of today:  Health Maintenance   Topic Date Due    PNEUMOCOCCAL VACCINE 50+ YEARS (1 of 1 - PCV) Never done    COVID-19 VACCINE (1 - 2024-25 season) Never done    MAMMO SCREENING  01/31/2025    MEDICARE ANNUAL WELLNESS VISIT  03/04/2025    INFLUENZA VACCINE (Season Ended) 09/01/2025    COLORECTAL CANCER SCREENING  09/29/2025    LIPID  06/16/2026    FALL RISK ASSESSMENT  06/16/2026    DTAP/TDAP/TD VACCINE (2 - Td or Tdap) 02/07/2028    DIABETES SCREENING  06/16/2028    ADVANCE CARE PLANNING  03/04/2029    RSV VACCINE (1 - 1-dose 75+ series) 03/12/2031    DEXA  07/13/2038    PHQ-2 (once per calendar year)  Completed    ZOSTER VACCINE  Completed    HPV VACCINE  Aged Out    MENINGITIS VACCINE  Aged Out    HEPATITIS C SCREENING  Discontinued          Objective    Exam  /82 (BP Location: Right arm, Patient Position: Sitting, Cuff Size: Adult Regular)   Pulse 61   Temp 97.5  F (36.4  C) (Tympanic)   Resp 16   Ht 1.607 m (5' 3.25\")   Wt 50 kg (110 lb 3.2 oz)   LMP  (LMP Unknown)   SpO2 98%   BMI 19.37 kg/m     Estimated body mass index is 19.37 kg/m  as calculated from the following:    Height as of this encounter: 1.607 m (5' 3.25\").    Weight as of this encounter: 50 kg (110 lb 3.2 oz).    Physical Exam  Constitutional:       Appearance: She is well-developed.   Eyes:      Conjunctiva/sclera: Conjunctivae normal.   Neck:      Thyroid: No thyromegaly.   Cardiovascular:      Rate and Rhythm: Normal rate and regular rhythm.      Heart sounds: Normal heart sounds. No murmur heard.  Pulmonary:      Effort: Pulmonary effort is normal. No respiratory distress.      Breath sounds: Normal breath sounds.   Abdominal:      Palpations: Abdomen is soft.   Lymphadenopathy:      Cervical: No cervical " adenopathy.   Skin:     Findings: No rash.   Neurological:      Mental Status: She is alert and oriented to person, place, and time.               6/16/2025   Mini Cog   Clock Draw Score 2 Normal   3 Item Recall 3 objects recalled   Mini Cog Total Score 5              Signed Electronically by: Kamryn Lopez MD

## 2025-07-01 ENCOUNTER — DOCUMENTATION ONLY (OUTPATIENT)
Dept: OTHER | Facility: CLINIC | Age: 70
End: 2025-07-01
Payer: COMMERCIAL

## 2025-07-02 ENCOUNTER — TELEPHONE (OUTPATIENT)
Dept: SURGERY | Facility: OTHER | Age: 70
End: 2025-07-02
Payer: COMMERCIAL

## 2025-07-02 DIAGNOSIS — Z12.11 ENCOUNTER FOR SCREENING COLONOSCOPY: Primary | ICD-10-CM

## 2025-07-02 RX ORDER — POLYETHYLENE GLYCOL 3350, SODIUM CHLORIDE, SODIUM BICARBONATE, POTASSIUM CHLORIDE 420; 11.2; 5.72; 1.48 G/4L; G/4L; G/4L; G/4L
4000 POWDER, FOR SOLUTION ORAL ONCE
Qty: 4000 ML | Refills: 0 | Status: SHIPPED | OUTPATIENT
Start: 2025-08-28 | End: 2025-08-28

## 2025-07-02 RX ORDER — BISACODYL 5 MG/1
TABLET, DELAYED RELEASE ORAL
Qty: 2 TABLET | Refills: 0 | Status: SHIPPED | OUTPATIENT
Start: 2025-08-28

## 2025-07-02 NOTE — TELEPHONE ENCOUNTER
Screening Questions for the Scheduling of Screening Colonoscopies   (If Colonoscopy is diagnostic, Provider should review the chart before scheduling.)  Are you younger than 45 or older than 80?  NO  Do you take aspirin or fish oil?  FISH OIL (if yes, tell patient to stop 1 week prior to Colonoscopy)  Do you take warfarin (Coumadin), clopidogrel (Plavix), apixaban (Eliquis), dabigatram (Pradaxa), rivaroxaban (Xarelto) or any blood thinner? NO  Do you take semaglutide (Ozempic or Wegovy), tirzepatide (Mounjaro or Zepbound), liraglutide (Victoza), or dulaglutide (Trulicity)? NO  Do you use oxygen or a CPAP at home?  NO  Do you have kidney disease? NO  Are you on dialysis? NO  Have you had a stroke or heart attack in the last year? NO  Have you had a stent in your heart or any blood vessel in the last year? NO  Have you had a transplant of any organ? NO  Have you had a colonoscopy or upper endoscopy (EGD) before? YES         When?  2020  Date of scheduled Colonoscopy. 09/04/2025  Provider CONNIE  Pharmacy [facility]  Order Priority ROUTINE

## 2025-08-26 ENCOUNTER — HOSPITAL ENCOUNTER (OUTPATIENT)
Dept: BONE DENSITY | Facility: OTHER | Age: 70
Discharge: HOME | End: 2025-08-26
Attending: FAMILY MEDICINE
Payer: MEDICARE

## 2025-08-26 ENCOUNTER — HOSPITAL ENCOUNTER (OUTPATIENT)
Dept: MAMMOGRAPHY | Facility: OTHER | Age: 70
Discharge: HOME | End: 2025-08-26
Attending: FAMILY MEDICINE
Payer: MEDICARE

## 2025-08-26 DIAGNOSIS — M81.0 OSTEOPOROSIS, UNSPECIFIED OSTEOPOROSIS TYPE, UNSPECIFIED PATHOLOGICAL FRACTURE PRESENCE: ICD-10-CM

## 2025-08-26 DIAGNOSIS — Z12.31 VISIT FOR SCREENING MAMMOGRAM: ICD-10-CM

## 2025-08-26 DIAGNOSIS — E28.39 ESTROGEN DEFICIENCY: ICD-10-CM

## 2025-08-26 PROCEDURE — 77063 BREAST TOMOSYNTHESIS BI: CPT

## 2025-08-26 PROCEDURE — 77080 DXA BONE DENSITY AXIAL: CPT | Mod: 26 | Performed by: RADIOLOGY

## 2025-08-26 PROCEDURE — 77080 DXA BONE DENSITY AXIAL: CPT

## 2025-09-04 ENCOUNTER — HOSPITAL ENCOUNTER (OUTPATIENT)
Facility: OTHER | Age: 70
Discharge: HOME | End: 2025-09-04
Attending: SURGERY | Admitting: SURGERY
Payer: MEDICARE

## 2025-09-04 ENCOUNTER — ANESTHESIA (OUTPATIENT)
Dept: SURGERY | Facility: OTHER | Age: 70
End: 2025-09-04
Payer: MEDICARE

## 2025-09-04 ENCOUNTER — ANESTHESIA EVENT (OUTPATIENT)
Dept: SURGERY | Facility: OTHER | Age: 70
End: 2025-09-04
Payer: MEDICARE

## 2025-09-04 VITALS
WEIGHT: 110 LBS | HEIGHT: 63 IN | RESPIRATION RATE: 16 BRPM | BODY MASS INDEX: 19.49 KG/M2 | HEART RATE: 105 BPM | SYSTOLIC BLOOD PRESSURE: 129 MMHG | DIASTOLIC BLOOD PRESSURE: 88 MMHG | TEMPERATURE: 96.9 F | OXYGEN SATURATION: 97 %

## 2025-09-04 PROCEDURE — 45380 COLONOSCOPY AND BIOPSY: CPT | Performed by: SURGERY

## 2025-09-04 PROCEDURE — 250N000011 HC RX IP 250 OP 636: Performed by: NURSE ANESTHETIST, CERTIFIED REGISTERED

## 2025-09-04 PROCEDURE — 258N000003 HC RX IP 258 OP 636: Performed by: SURGERY

## 2025-09-04 PROCEDURE — 999N000010 HC STATISTIC ANES STAT CODE-CRNA PER MINUTE: Performed by: SURGERY

## 2025-09-04 PROCEDURE — 250N000009 HC RX 250: Performed by: NURSE ANESTHETIST, CERTIFIED REGISTERED

## 2025-09-04 RX ORDER — PROPOFOL 10 MG/ML
INJECTION, EMULSION INTRAVENOUS CONTINUOUS PRN
Status: DISCONTINUED | OUTPATIENT
Start: 2025-09-04 | End: 2025-09-04

## 2025-09-04 RX ORDER — NALOXONE HYDROCHLORIDE 0.4 MG/ML
0.4 INJECTION, SOLUTION INTRAMUSCULAR; INTRAVENOUS; SUBCUTANEOUS
Status: DISCONTINUED | OUTPATIENT
Start: 2025-09-04 | End: 2025-09-04 | Stop reason: HOSPADM

## 2025-09-04 RX ORDER — FLUMAZENIL 0.1 MG/ML
0.2 INJECTION, SOLUTION INTRAVENOUS
Status: DISCONTINUED | OUTPATIENT
Start: 2025-09-04 | End: 2025-09-04 | Stop reason: HOSPADM

## 2025-09-04 RX ORDER — LIDOCAINE 40 MG/G
CREAM TOPICAL
Status: DISCONTINUED | OUTPATIENT
Start: 2025-09-04 | End: 2025-09-04 | Stop reason: HOSPADM

## 2025-09-04 RX ORDER — NALOXONE HYDROCHLORIDE 0.4 MG/ML
0.2 INJECTION, SOLUTION INTRAMUSCULAR; INTRAVENOUS; SUBCUTANEOUS
Status: DISCONTINUED | OUTPATIENT
Start: 2025-09-04 | End: 2025-09-04 | Stop reason: HOSPADM

## 2025-09-04 RX ORDER — LIDOCAINE HYDROCHLORIDE 20 MG/ML
INJECTION, SOLUTION INFILTRATION; PERINEURAL PRN
Status: DISCONTINUED | OUTPATIENT
Start: 2025-09-04 | End: 2025-09-04

## 2025-09-04 RX ORDER — PROPOFOL 10 MG/ML
INJECTION, EMULSION INTRAVENOUS PRN
Status: DISCONTINUED | OUTPATIENT
Start: 2025-09-04 | End: 2025-09-04

## 2025-09-04 RX ORDER — SODIUM CHLORIDE, SODIUM LACTATE, POTASSIUM CHLORIDE, CALCIUM CHLORIDE 600; 310; 30; 20 MG/100ML; MG/100ML; MG/100ML; MG/100ML
INJECTION, SOLUTION INTRAVENOUS CONTINUOUS
Status: DISCONTINUED | OUTPATIENT
Start: 2025-09-04 | End: 2025-09-04 | Stop reason: HOSPADM

## 2025-09-04 RX ADMIN — PROPOFOL 90 MG: 10 INJECTION, EMULSION INTRAVENOUS at 08:52

## 2025-09-04 RX ADMIN — LIDOCAINE HYDROCHLORIDE 40 MG: 20 INJECTION, SOLUTION INFILTRATION; PERINEURAL at 08:52

## 2025-09-04 RX ADMIN — SODIUM CHLORIDE, SODIUM LACTATE, POTASSIUM CHLORIDE, AND CALCIUM CHLORIDE 30 ML/HR: .6; .31; .03; .02 INJECTION, SOLUTION INTRAVENOUS at 07:47

## 2025-09-04 RX ADMIN — PROPOFOL 75 MCG/KG/MIN: 10 INJECTION, EMULSION INTRAVENOUS at 08:52

## 2025-09-04 ASSESSMENT — ACTIVITIES OF DAILY LIVING (ADL)
ADLS_ACUITY_SCORE: 41

## (undated) DEVICE — ENDO TRAP POLYP E-TRAP 00711099

## (undated) DEVICE — SOL WATER 1500ML

## (undated) DEVICE — TUBING SUCTION 10'X3/16" N510

## (undated) DEVICE — ENDO KIT COMPLIANCE DYKENDOCMPLY

## (undated) DEVICE — ENDO SNARE POLYPECTOMY OVAL 10MM LOOP SD-240U-10

## (undated) DEVICE — ENDO SNARE EXACTO COLD 9MM LOOP 2.4MMX230CM 00711115

## (undated) DEVICE — SUCTION MANIFOLD NEPTUNE 2 SYS 4 PORT 0702-020-000

## (undated) DEVICE — ESU GROUND PAD ADULT W/CORD E7507

## (undated) DEVICE — ENDO FORCEP ENDOJAW BIOPSY 2.8MMX230CM FB-220U

## (undated) DEVICE — ENDO BRUSH CHANNEL MASTER CLEANING 2-4.2MM BW-412T

## (undated) RX ORDER — PROPOFOL 10 MG/ML
INJECTION, EMULSION INTRAVENOUS
Status: DISPENSED
Start: 2020-09-29

## (undated) RX ORDER — PROPOFOL 10 MG/ML
INJECTION, EMULSION INTRAVENOUS
Status: DISPENSED
Start: 2025-09-04

## (undated) RX ORDER — LIDOCAINE HYDROCHLORIDE 10 MG/ML
INJECTION, SOLUTION EPIDURAL; INFILTRATION; INTRACAUDAL; PERINEURAL
Status: DISPENSED
Start: 2020-09-29

## (undated) RX ORDER — LIDOCAINE HYDROCHLORIDE 20 MG/ML
INJECTION, SOLUTION EPIDURAL; INFILTRATION; INTRACAUDAL; PERINEURAL
Status: DISPENSED
Start: 2020-09-29